# Patient Record
Sex: FEMALE | Race: WHITE | Employment: OTHER | URBAN - METROPOLITAN AREA
[De-identification: names, ages, dates, MRNs, and addresses within clinical notes are randomized per-mention and may not be internally consistent; named-entity substitution may affect disease eponyms.]

---

## 2017-08-27 ENCOUNTER — APPOINTMENT (OUTPATIENT)
Dept: GENERAL RADIOLOGY | Age: 77
End: 2017-08-27
Attending: STUDENT IN AN ORGANIZED HEALTH CARE EDUCATION/TRAINING PROGRAM
Payer: MEDICARE

## 2017-08-27 ENCOUNTER — APPOINTMENT (OUTPATIENT)
Dept: CT IMAGING | Age: 77
End: 2017-08-27
Attending: STUDENT IN AN ORGANIZED HEALTH CARE EDUCATION/TRAINING PROGRAM
Payer: MEDICARE

## 2017-08-27 ENCOUNTER — HOSPITAL ENCOUNTER (EMERGENCY)
Age: 77
Discharge: HOME | End: 2017-08-27
Attending: STUDENT IN AN ORGANIZED HEALTH CARE EDUCATION/TRAINING PROGRAM
Payer: MEDICARE

## 2017-08-27 VITALS
OXYGEN SATURATION: 99 % | HEIGHT: 65 IN | HEART RATE: 70 BPM | TEMPERATURE: 98.6 F | SYSTOLIC BLOOD PRESSURE: 142 MMHG | BODY MASS INDEX: 29.99 KG/M2 | RESPIRATION RATE: 16 BRPM | DIASTOLIC BLOOD PRESSURE: 75 MMHG | WEIGHT: 180 LBS

## 2017-08-27 DIAGNOSIS — M25.561 ACUTE PAIN OF RIGHT KNEE: Primary | ICD-10-CM

## 2017-08-27 PROCEDURE — 96372 THER/PROPH/DIAG INJ SC/IM: CPT

## 2017-08-27 PROCEDURE — 73502 X-RAY EXAM HIP UNI 2-3 VIEWS: CPT

## 2017-08-27 PROCEDURE — G8979 MOBILITY GOAL STATUS: HCPCS

## 2017-08-27 PROCEDURE — 73562 X-RAY EXAM OF KNEE 3: CPT

## 2017-08-27 PROCEDURE — 97116 GAIT TRAINING THERAPY: CPT

## 2017-08-27 PROCEDURE — 73700 CT LOWER EXTREMITY W/O DYE: CPT

## 2017-08-27 PROCEDURE — 97161 PT EVAL LOW COMPLEX 20 MIN: CPT

## 2017-08-27 PROCEDURE — G8980 MOBILITY D/C STATUS: HCPCS

## 2017-08-27 PROCEDURE — G8978 MOBILITY CURRENT STATUS: HCPCS

## 2017-08-27 PROCEDURE — 72192 CT PELVIS W/O DYE: CPT

## 2017-08-27 PROCEDURE — 99284 EMERGENCY DEPT VISIT MOD MDM: CPT

## 2017-08-27 PROCEDURE — 74011250636 HC RX REV CODE- 250/636: Performed by: STUDENT IN AN ORGANIZED HEALTH CARE EDUCATION/TRAINING PROGRAM

## 2017-08-27 RX ORDER — KETOROLAC TROMETHAMINE 30 MG/ML
15 INJECTION, SOLUTION INTRAMUSCULAR; INTRAVENOUS
Status: COMPLETED | OUTPATIENT
Start: 2017-08-27 | End: 2017-08-27

## 2017-08-27 RX ORDER — KETOROLAC TROMETHAMINE 10 MG/1
10 TABLET, FILM COATED ORAL
Qty: 12 TAB | Refills: 0 | Status: SHIPPED | OUTPATIENT
Start: 2017-08-27 | End: 2017-08-30

## 2017-08-27 RX ADMIN — KETOROLAC TROMETHAMINE 15 MG: 30 INJECTION, SOLUTION INTRAMUSCULAR at 09:10

## 2017-08-27 NOTE — PROGRESS NOTES
physical Therapy EVALUATION/DISCHARGE  Patient: Mohit Georges (89 y.o. female)  Date: 8/27/2017  Primary Diagnosis: There are no admission diagnoses documented for this encounter. Precautions:   Fall  ASSESSMENT :  Based on the objective data described below, the patient presents with pain and impaired balance limiting patient's safe functional mobility s/p fall at home. Patient completely independent prior, able to ambulate 100 ft using RW with SBA this date. Patient instructed in safe use of RW and pain management strategies for home. Patient cleared for discharge home using RW; RN notified. Patient has family that can assist at discharge. Skilled physical therapy is not indicated at this time. PLAN :  Discharge Recommendations: None  Further Equipment Recommendations for Discharge: none, has RW and rollator at home     SUBJECTIVE:   Patient stated I'm going to follow up with Dr. Cee Rascon because this knee has been bothering me.     OBJECTIVE DATA SUMMARY:   HISTORY:    Past Medical History:   Diagnosis Date    Arthritis     Gastrointestinal disorder     heartburn    Hypertension     PATIENT DENIES    Ill-defined condition     HAS LEFT TORN ROTATOR CUFF NOT REPAIRED    Psychiatric disorder     DEPRESSION    Respiratory abnormalities     Unspecified adverse effect of anesthesia     NUMEROUS TMJ SURGERIES CAUSING DIFFICULT INTUBATION SOMETIMES     Past Surgical History:   Procedure Laterality Date    ABDOMEN SURGERY PROC UNLISTED      tubal ligation    HX HEENT  1983-1988 X4    TMJ SURGERY X4    HX KNEE ARTHROSCOPY Right 2002    HX MOHS PROCEDURES Right 2007    HX TUBAL LIGATION  1974     Prior Level of Function/Home Situation: independent, lives alone  Personal factors and/or comorbidities impacting plan of care:     Home Situation  Home Environment: Private residence  # Steps to Enter: 0  Wheelchair Ramp: Yes  One/Two Story Residence: One story  Living Alone: Yes  Support Systems: Family member(s)  Patient Expects to be Discharged to[de-identified] Private residence  Current DME Used/Available at Home: Grab bars, Walker, rollator, Walker, rolling    EXAMINATION/PRESENTATION/DECISION MAKING:   Critical Behavior:  Neurologic State: Alert  Orientation Level: Oriented X4  Cognition: Appropriate decision making, Appropriate for age attention/concentration, Appropriate safety awareness  Safety/Judgement: Awareness of environment, Insight into deficits  Hearing: Auditory  Auditory Impairment: None  Skin:  Intact to exposed areas  Edema: none noted  Range Of Motion:  AROM: Within functional limits           PROM: Within functional limits           Strength:    Strength: Within functional limits                    Tone & Sensation:   Tone: Normal              Sensation: Intact               Coordination:  Coordination: Within functional limits  Vision:      Functional Mobility:  Bed Mobility:     Supine to Sit: Independent        Transfers:  Sit to Stand: Modified independent  Stand to Sit: Modified independent                       Balance:   Sitting: Intact  Standing: Intact; With support  Ambulation/Gait Training:  Distance (ft): 100 Feet (ft)  Assistive Device: Gait belt;Walker, rolling  Ambulation - Level of Assistance: Stand-by asssistance     Gait Description (WDL): Exceptions to WDL  Gait Abnormalities: Antalgic  Right Side Weight Bearing: As tolerated     Base of Support: Widened  Stance: Right decreased  Speed/Fariba: Shuffled; Slow                  Functional Measure:  Barthel Index:    Bathin  Bladder: 10  Bowels: 10  Groomin  Dressing: 10  Feeding: 10  Mobility: 10  Stairs: 5  Toilet Use: 10  Transfer (Bed to Chair and Back): 15  Total: 90       Barthel and G-code impairment scale:  Percentage of impairment CH  0% CI  1-19% CJ  20-39% CK  40-59% CL  60-79% CM  80-99% CN  100%   Barthel Score 0-100 100 99-80 79-60 59-40 20-39 1-19   0   Barthel Score 0-20 20 17-19 13-16 9-12 5-8 1-4 0      The Barthel ADL Index: Guidelines  1. The index should be used as a record of what a patient does, not as a record of what a patient could do. 2. The main aim is to establish degree of independence from any help, physical or verbal, however minor and for whatever reason. 3. The need for supervision renders the patient not independent. 4. A patient's performance should be established using the best available evidence. Asking the patient, friends/relatives and nurses are the usual sources, but direct observation and common sense are also important. However direct testing is not needed. 5. Usually the patient's performance over the preceding 24-48 hours is important, but occasionally longer periods will be relevant. 6. Middle categories imply that the patient supplies over 50 per cent of the effort. 7. Use of aids to be independent is allowed. Qi Nielsen., Barthel, D.W. (6647). Functional evaluation: the Barthel Index. [address] [facility] (14)2. Joan June candi HAYDEN Rogers, Vanessa Noguera., Fatmata Bingham., [place], [address] [place] [address] Ave (1999). Measuring the change indisability after inpatient rehabilitation; comparison of the responsiveness of the Barthel Index and Functional [place] Measure. Journal of Neurology, Neurosurgery, and Psychiatry, 66(4), 564-906. Christina Patel, N.J.A, HEATH Luna, & Mechelle Miner MYINA. (2004.) Assessment of post-stroke quality of life in cost-effectiveness studies: The usefulness of the Barthel Index and the EuroQoL-5D. Quality of Life Research, 13, 562-53       G codes: In compliance with CMSs Claims Based Outcome Reporting, the following G-code set was chosen for this patient based on their primary functional limitation being treated: The outcome measure chosen to determine the severity of the functional limitation was the Barthel Index with a score of 90/100 which was correlated with the impairment scale.     ? Mobility - Walking and Moving Around:     - CURRENT STATUS: CI - 1%-19% impaired, limited or restricted    - GOAL STATUS: CI - 1%-19% impaired, limited or restricted    - D/C STATUS:  CI - 1%-19% impaired, limited or restricted           Pain:Pain Scale 1: Numeric (0 - 10)  Pain Intensity 1: 5  Pain Location 1: Hip  Activity Tolerance:   VSS  Please refer to the flowsheet for vital signs taken during this treatment. After treatment:   [x]   Patient left in no apparent distress sitting up in chair  []   Patient left in no apparent distress in bed  [x]   Call bell left within reach  [x]   Nursing notified  [x]   Caregiver present  []   Bed alarm activated    COMMUNICATION/EDUCATION:   Communication/Collaboration:  [x]   Fall prevention education was provided and the patient/caregiver indicated understanding. [x]   Patient/family have participated as able and agree with findings and recommendations. []   Patient is unable to participate in plan of care at this time.   Findings and recommendations were discussed with: Registered Nurse    Thank you for this referral.  Zackery Hill, PT   Time Calculation: 18 mins

## 2017-08-27 NOTE — ED NOTES
Attempted to ambulate pt with crutches. Pt unable to walk with crutches due to increase pain to right knee. Pt states sh is unable to apply any pressure. Dr Ariane Holland made aware. Order placed for PT to see pt.

## 2017-08-27 NOTE — ED PROVIDER NOTES
HPI Comments: 69 y.o. female with past medical history significant for Hypertension, Depression, and Arthritis who presents from Home with chief complaint of Right Hip Pain. Patient states earlier this morning she was walking with her cane when \"she tripped over a hose\" falling onto her right leg. Pt states immediate onset of right leg pain. Pt states constant moderate right hip pain with radiation down her right leg. Pt states aggravation with applied pressure and with ROM of her right hip. Pt denies hitting her head or LOC. Pt denies fever, chills, cough, congestion, SOB, chest pain, abdominal pain, nausea, vomiting, diarrhea, difficulty urinating, or dysuria. Pt denies any other acute medical complaints. There are no other acute medical concerns at this time. PCP: Michael Goel MD    Note written by Rachael Holguin, as dictated by Jose Kenny MD 8:22 AM    The history is provided by the patient.         Past Medical History:   Diagnosis Date    Arthritis     Gastrointestinal disorder     heartburn    Hypertension     PATIENT DENIES    Ill-defined condition     HAS LEFT TORN ROTATOR CUFF NOT REPAIRED    Psychiatric disorder     DEPRESSION    Respiratory abnormalities     Unspecified adverse effect of anesthesia     NUMEROUS TMJ SURGERIES CAUSING DIFFICULT INTUBATION SOMETIMES       Past Surgical History:   Procedure Laterality Date    ABDOMEN SURGERY PROC UNLISTED      tubal ligation    HX HEENT  [id number] X4    TMJ SURGERY X4    HX KNEE ARTHROSCOPY Right 2002    HX MOHS PROCEDURES Right 2007    HX TUBAL LIGATION  1974         Family History:   Problem Relation Age of Onset    Stroke Mother     Diabetes Father     Heart Attack Sister     Diabetes Sister     Cancer Sister      URETHRA    Asthma Brother     Other Brother      SUICIDE    Heart Attack Brother     Heart Disease Sister     Heart Surgery Sister     Cancer Brother      PROSTATE    Diabetes Brother Social History     Social History    Marital status:      Spouse name: N/A    Number of children: N/A    Years of education: N/A     Occupational History    Not on file. Social History Main Topics    Smoking status: Never Smoker    Smokeless tobacco: Never Used    Alcohol use No    Drug use: No    Sexual activity: Not on file     Other Topics Concern    Not on file     Social History Narrative         ALLERGIES: Codeine    Review of Systems   Constitutional: Negative for chills and fever. HENT: Negative for congestion. Respiratory: Negative for cough and shortness of breath. Cardiovascular: Negative for chest pain. Gastrointestinal: Negative for abdominal pain, diarrhea, nausea and vomiting. Genitourinary: Negative for difficulty urinating and dysuria. Musculoskeletal: Positive for arthralgias. All other systems reviewed and are negative. Vitals:    08/27/17 0930 08/27/17 0945 08/27/17 1100 08/27/17 1145   BP: 142/85 129/57 146/72 149/77   Pulse:       Resp:       Temp:       SpO2: 99% 98% 98% 99%   Weight:       Height:                Physical Exam   Constitutional: She is oriented to person, place, and time. She appears well-developed and well-nourished. No distress. HENT:   Head: Normocephalic and atraumatic. Nose: Nose normal.   Mouth/Throat: Oropharynx is clear and moist. No oropharyngeal exudate. Eyes: Conjunctivae and EOM are normal. Right eye exhibits no discharge. Left eye exhibits no discharge. No scleral icterus. Neck: Normal range of motion. Neck supple. No JVD present. No tracheal deviation present. No thyromegaly present. Cardiovascular: Normal rate, regular rhythm, normal heart sounds and intact distal pulses. Exam reveals no gallop and no friction rub. No murmur heard. Pulmonary/Chest: Effort normal and breath sounds normal. No stridor. No respiratory distress. She has no wheezes. She has no rales. She exhibits no tenderness.    Abdominal: Bowel sounds are normal. She exhibits no distension and no mass. There is no tenderness. There is no rebound. Musculoskeletal: She exhibits tenderness. She exhibits no deformity. Tenderness with ROM of right hip   Lymphadenopathy:     She has no cervical adenopathy. Neurological: She is alert and oriented to person, place, and time. No cranial nerve deficit. Coordination normal.   Skin: Skin is warm and dry. No rash noted. She is not diaphoretic. No erythema. No pallor. Psychiatric: She has a normal mood and affect. Her behavior is normal. Judgment and thought content normal.    Note written by Rachael Payan, as dictated by Ruben Chavira MD 9:18 AM    MDM  Number of Diagnoses or Management Options  Acute pain of right knee:      Amount and/or Complexity of Data Reviewed  Tests in the radiology section of CPT®: ordered and reviewed  Review and summarize past medical records: yes    Risk of Complications, Morbidity, and/or Mortality  Presenting problems: moderate  Diagnostic procedures: moderate  Management options: moderate    Patient Progress  Patient progress: stable    ED Course       Procedures      PROGRESS NOTE:9:18 AM  Right knee xray is negative      PROGRESS NOTE:10:27 AM  Hip/Pelvic Xray is negative    Provider has discussed results and plan of treatment with patient and patient's family. Patient expressed understanding and agreement of treatment plan. Patient continues to report pain. Will CT pelvis    PROGRESS NOTE:12:12 PM  CT is negative    3:57 PM  The patient has been reevaluated. The patient is ready for discharge. The patient's signs, symptoms, diagnosis, and discharge instructions have been discussed and the patient/ family has conveyed their understanding. The patient is to follow up as recommended or return to the ED should their symptoms worsen. Plan has been discussed and the patient is in agreement.     LABORATORY TESTS:  No results found for this or any previous visit (from the past 12 hour(s)). IMAGING RESULTS:  CT LOW EXT RT WO CONT   Final Result      CT PELV WO CONT   Final Result      XR HIP RT W OR WO PELV 2-3 VWS   Final Result      XR KNEE RT 3 V   Final Result        No results found. MEDICATIONS GIVEN:  Medications   ketorolac (TORADOL) injection 15 mg (15 mg IntraMUSCular Given 8/27/17 0910)       IMPRESSION:  1. Acute pain of right knee        PLAN:  1. Discharge Medication List as of 8/27/2017  1:48 PM      START taking these medications    Details   ketorolac (TORADOL) 10 mg tablet Take 1 Tab by mouth every six (6) hours as needed for Pain for up to 3 days. , Print, Disp-12 Tab, R-0         CONTINUE these medications which have NOT CHANGED    Details   oxyCODONE IR (ROXICODONE) 5 mg immediate release tablet Take 1 tablet by mouth every three (3) hours as needed. , Print, Disp-80 tablet, R-0      ascorbic acid (VITAMIN C) 500 mg tablet Take  by mouth every Monday, Wednesday, Friday., Historical Med      Calcium-Cholecalciferol, D3, (CALCIUM 600 WITH VITAMIN D3) 600 mg(1,500mg) -400 unit cap Take  by mouth every Monday, Wednesday, Friday., Historical Med      VIT B COMPLEX 100 CMB #2/HERBS (VITAMIN B COMPLEX 100 #2-HERBS PO) Take  by mouth every Monday, Wednesday, Friday., Historical Med           2.    Follow-up Information     Follow up With Details Comments Contact Info    Marita Rider MD  If symptoms worsen [address] [facility] EMERGENCY DEP  If symptoms worsen [address] [facility]  [phone]            Return to ED for new or worsening symptoms       Jluis Mcnally MD

## 2017-08-27 NOTE — ED NOTES
Attempted to ambulate pt. Pt unable to ambulate and apply pressure to right knee. Dr Leila Harris made aware. V/O to ambulate pt on crutches.

## 2017-08-27 NOTE — DISCHARGE INSTRUCTIONS
We hope that we have addressed all of your medical concerns. The examination and treatment you received in the Emergency Department were for an emergent problem and were not intended as complete care. It is important that you follow up with your healthcare provider(s) for ongoing care. If your symptoms worsen or do not improve as expected, and you are unable to reach your usual health care provider(s), you should return to the Emergency Department. Today's healthcare is undergoing tremendous change, and patient satisfaction surveys are one of the many tools to assess the quality of medical care. You may receive a survey from the [employer] regarding your experience in the Emergency Department. I hope that your experience has been completely positive, particularly the medical care that I provided. As such, please participate in the survey; anything less than excellent does not meet my expectations or intentions. [facility] and [facility] participate in nationally recognized quality of care measures. If your blood pressure is greater than 120/80, as reported below, we urge that you seek medical care to address the potential of high blood pressure, commonly known as hypertension. Hypertension can be hereditary or can be caused by certain medical conditions, pain, stress, or \"white coat syndrome. \"       Please make an appointment with your health care provider(s) for follow up of your Emergency Department visit.        VITALS:   Patient Vitals for the past 8 hrs:   Temp Pulse Resp BP SpO2   08/27/17 1145 - - - 149/77 99 %   08/27/17 1100 - - - 146/72 98 %   08/27/17 0945 - - - 129/57 98 %   08/27/17 0930 - - - 142/85 99 %   08/27/17 0915 - - - 161/76 100 %   08/27/17 0900 - - - 146/64 100 %   08/27/17 0845 - - - 148/72 98 %   08/27/17 0830 - - - 144/71 98 %   08/27/17 0815 - - - (!) 138/124 96 %   08/27/17 0800 - - - 153/74 100 %   08/27/17 0753 98.7 °F (37.1 °C) 80 18 174/74 98 %          Thank you for allowing us to provide you with medical care today. We realize that you have many choices for your emergency care needs. Please choose us in the future for any continued health care needs. Burleigh Eisenmenger Eunice Bower, [address] [facility].   Office: [phone]            No results found for this or any previous visit (from the past 24 hour(s)). Xr Hip Rt W Or Wo Pelv 2-3 Vws    Result Date: 8/27/2017  INDICATION:  eval for hip fx. Right hip pain, status post ground-level fall this morning. AP view of the pelvis and lateral view of the right hip. No acute fracture or dislocation is visualized. There is moderate narrowing of the hip joint spaces bilaterally. Bones are well-mineralized. Soft tissues are normal. No lytic or sclerotic osseous lesion is visualized. IMPRESSION: No evidence of acute fracture or dislocation. Ct Pelv Wo Cont    Result Date: 8/27/2017  INDICATION: Right hip pain, status post ground-level fall this morning. Noncontrast CT of the pelvis is performed with 0.65 mm collimation. Sagittal and coronal reformatted images were also obtained. CT dose reduction was achieved with the use of the standardized protocol tailored for this examination and automatic exposure control for dose modulation. Direct comparison is made to prior films dated August 27, 2017. The osseous structures are diffusely demineralized. No acute fracture is visualized. No lytic or sclerotic osseous lesion is seen. There is no diastases of the SI joints or pubic symphysis. IMPRESSION: No evidence of acute fracture or dislocation. If clinical suspicion for occult fracture persists, MR is recommended. Ct Low Ext Rt Wo Cont    Result Date: 8/27/2017  EXAM:  CT LOW EXT RT WO CONT INDICATION:  Right knee pain since fall this morning.  COMPARISON: Right knee views earlier today at 8:46 AM. TECHNIQUE: Helical CT of the right knee with coronal and sagittal reformats. Images reviewed in soft tissue and bone windows. CT dose reduction was achieved through the use of a standardized protocol tailored for this examination and automatic exposure control for dose modulation. CONTRAST: None. FINDINGS: Bones: Bones are osteopenic. No acute fracture or dislocation. No lytic or blastic lesion. Joint fluid:  Trace joint fluid. No radiodense foreign body. Articulations: Mild patellofemoral and lateral compartment osteoarthritis. Moderate medial compartment osteoarthritis. No erosion or chondrocalcinosis. Tendons: Extensor mechanism is intact. No evidence of full-thickness tear. Muscles: Mild atrophy. Soft tissue mass: None. IMPRESSION: 1. No fracture. 2. 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 Xr Knee Rt 3 V    Result Date: 8/27/2017  INDICATION:  eval for knee fx. Ground-level fall this morning. Exam: AP, lateral, oblique views of the right knee. FINDINGS: There is no acute fracture-dislocation. There is moderate narrowing of the medial tibiofemoral joint space. There are small tricompartmental osteophytes. Quadriceps tendon enthesophyte is noted. No suprapatellar joint fluid is seen. IMPRESSION: No acute fracture or dislocation. Knee Pain or Injury: Care Instructions  Your Care Instructions    Injuries are a common cause of knee problems. Sudden (acute) injuries may be caused by a direct blow to the knee. They can also be caused by abnormal twisting, bending, or falling on the knee. Pain, bruising, or swelling may be severe, and may start within minutes of the injury. Overuse is another cause of knee pain. Other causes are climbing stairs, kneeling, and other activities that use the knee. Everyday wear and tear, especially as you get older, also can cause knee pain.   Rest, along with home treatment, often relieves pain and allows your knee to heal. If you have a serious knee injury, you may need tests and treatment. Follow-up care is a key part of your treatment and safety. Be sure to make and go to all appointments, and call your doctor if you are having problems. It's also a good idea to know your test results and keep a list of the medicines you take. How can you care for yourself at home? · Be safe with medicines. Read and follow all instructions on the label. ¨ If the doctor gave you a prescription medicine for pain, take it as prescribed. ¨ If you are not taking a prescription pain medicine, ask your doctor if you can take an over-the-counter medicine. · Rest and protect your knee. Take a break from any activity that may cause pain. · Put ice or a cold pack on your knee for 10 to 20 minutes at a time. Put a thin cloth between the ice and your skin. · Prop up a sore knee on a pillow when you ice it or anytime you sit or lie down for the next 3 days. Try to keep it above the level of your heart. This will help reduce swelling. · If your knee is not swollen, you can put moist heat, a heating pad, or a warm cloth on your knee. · If your doctor recommends an elastic bandage, sleeve, or other type of support for your knee, wear it as directed. · Follow your doctor's instructions about how much weight you can put on your leg. Use a cane, crutches, or a walker as instructed. · Follow your doctor's instructions about activity during your healing process. If you can do mild exercise, slowly increase your activity. · Reach and stay at a healthy weight. Extra weight can strain the joints, especially the knees and hips, and make the pain worse. Losing even a few pounds may help. When should you call for help? Call 911 anytime you think you may need emergency care. For example, call if:  · You have symptoms of a blood clot in your lung (called a pulmonary embolism). These may include:  ¨ Sudden chest pain. ¨ Trouble breathing. ¨ Coughing up blood.   Call your doctor now or seek immediate medical care if:  · You have severe or increasing pain. · Your leg or foot turns cold or changes color. · You cannot stand or put weight on your knee. · Your knee looks twisted or bent out of shape. · You cannot move your knee. · You have signs of infection, such as:  ¨ Increased pain, swelling, warmth, or redness. ¨ Red streaks leading from the knee. ¨ Pus draining from a place on your knee. ¨ A fever. · You have signs of a blood clot in your leg (called a deep vein thrombosis), such as:  ¨ Pain in your calf, back of the knee, thigh, or groin. ¨ Redness and swelling in your leg or groin. Watch closely for changes in your health, and be sure to contact your doctor if:  · You have tingling, weakness, or numbness in your knee. · You have any new symptoms, such as swelling. · You have bruises from a knee injury that last longer than 2 weeks. · You do not get better as expected. Where can you learn more? Go to http://lico-jake.info/. Enter K195 in the search box to learn more about \"Knee Pain or Injury: Care Instructions. \"  Current as of: March 20, 2017  Content Version: 11.3  © 7164-9260 [employer]. Care instructions adapted under license by [employer] (which disclaims liability or warranty for this information). If you have questions about a medical condition or this instruction, always ask your healthcare professional. [place] [address] any warranty or liability for your use of this information.

## 2017-10-30 ENCOUNTER — HOSPITAL ENCOUNTER (OUTPATIENT)
Dept: PREADMISSION TESTING | Age: 77
Discharge: HOME | End: 2017-10-30
Payer: MEDICARE

## 2017-10-30 ENCOUNTER — APPOINTMENT (OUTPATIENT)
Dept: PREADMISSION TESTING | Age: 77
End: 2017-10-30
Payer: MEDICARE

## 2017-10-30 VITALS
WEIGHT: 180.38 LBS | HEART RATE: 75 BPM | HEIGHT: 66 IN | SYSTOLIC BLOOD PRESSURE: 148 MMHG | BODY MASS INDEX: 28.99 KG/M2 | DIASTOLIC BLOOD PRESSURE: 82 MMHG | TEMPERATURE: 98 F

## 2017-10-30 LAB
ABO + RH BLD: NORMAL
ANION GAP SERPL CALC-SCNC: 6 MMOL/L (ref 5–15)
APPEARANCE UR: ABNORMAL
ATRIAL RATE: 71 BPM
BACTERIA URNS QL MICRO: ABNORMAL /HPF
BILIRUB UR QL: NEGATIVE
BLOOD GROUP ANTIBODIES SERPL: NORMAL
BUN SERPL-MCNC: 12 MG/DL (ref 6–20)
BUN/CREAT SERPL: 18 (ref 12–20)
CALCIUM SERPL-MCNC: 8.5 MG/DL (ref 8.5–10.1)
CALCULATED P AXIS, ECG09: 51 DEGREES
CALCULATED R AXIS, ECG10: 4 DEGREES
CALCULATED T AXIS, ECG11: 18 DEGREES
CAOX CRY URNS QL MICRO: ABNORMAL
CHLORIDE SERPL-SCNC: 108 MMOL/L (ref 97–108)
CO2 SERPL-SCNC: 27 MMOL/L (ref 21–32)
COLOR UR: ABNORMAL
CREAT SERPL-MCNC: 0.68 MG/DL (ref 0.55–1.02)
DIAGNOSIS, 93000: NORMAL
EPITH CASTS URNS QL MICRO: ABNORMAL /LPF
ERYTHROCYTE [DISTWIDTH] IN BLOOD BY AUTOMATED COUNT: 13.4 % (ref 11.5–14.5)
EST. AVERAGE GLUCOSE BLD GHB EST-MCNC: 114 MG/DL
GLUCOSE SERPL-MCNC: 91 MG/DL (ref 65–100)
GLUCOSE UR STRIP.AUTO-MCNC: NEGATIVE MG/DL
HBA1C MFR BLD: 5.6 % (ref 4.2–6.3)
HCT VFR BLD AUTO: 41.6 % (ref 35–47)
HGB BLD-MCNC: 13.4 G/DL (ref 11.5–16)
HGB UR QL STRIP: NEGATIVE
INR PPP: 1 (ref 0.9–1.1)
KETONES UR QL STRIP.AUTO: NEGATIVE MG/DL
LEUKOCYTE ESTERASE UR QL STRIP.AUTO: NEGATIVE
MCH RBC QN AUTO: 29.4 PG (ref 26–34)
MCHC RBC AUTO-ENTMCNC: 32.2 G/DL (ref 30–36.5)
MCV RBC AUTO: 91.2 FL (ref 80–99)
MUCOUS THREADS URNS QL MICRO: ABNORMAL /LPF
NITRITE UR QL STRIP.AUTO: NEGATIVE
P-R INTERVAL, ECG05: 188 MS
PH UR STRIP: 5.5 [PH] (ref 5–8)
PLATELET # BLD AUTO: 249 K/UL (ref 150–400)
POTASSIUM SERPL-SCNC: 4.3 MMOL/L (ref 3.5–5.1)
PROT UR STRIP-MCNC: NEGATIVE MG/DL
PROTHROMBIN TIME: 9.9 SEC (ref 9–11.1)
Q-T INTERVAL, ECG07: 392 MS
QRS DURATION, ECG06: 80 MS
QTC CALCULATION (BEZET), ECG08: 425 MS
RBC # BLD AUTO: 4.56 M/UL (ref 3.8–5.2)
RBC #/AREA URNS HPF: ABNORMAL /HPF (ref 0–5)
SODIUM SERPL-SCNC: 141 MMOL/L (ref 136–145)
SP GR UR REFRACTOMETRY: 1.02 (ref 1–1.03)
SPECIMEN EXP DATE BLD: NORMAL
UA: UC IF INDICATED,UAUC: ABNORMAL
UROBILINOGEN UR QL STRIP.AUTO: 0.2 EU/DL (ref 0.2–1)
VENTRICULAR RATE, ECG03: 71 BPM
WBC # BLD AUTO: 5.4 K/UL (ref 3.6–11)
WBC URNS QL MICRO: ABNORMAL /HPF (ref 0–4)
YEAST URNS QL MICRO: PRESENT

## 2017-10-30 PROCEDURE — 80048 BASIC METABOLIC PNL TOTAL CA: CPT | Performed by: ORTHOPAEDIC SURGERY

## 2017-10-30 PROCEDURE — 85610 PROTHROMBIN TIME: CPT | Performed by: ORTHOPAEDIC SURGERY

## 2017-10-30 PROCEDURE — 83036 HEMOGLOBIN GLYCOSYLATED A1C: CPT | Performed by: ORTHOPAEDIC SURGERY

## 2017-10-30 PROCEDURE — 81001 URINALYSIS AUTO W/SCOPE: CPT | Performed by: ORTHOPAEDIC SURGERY

## 2017-10-30 PROCEDURE — 36415 COLL VENOUS BLD VENIPUNCTURE: CPT | Performed by: ORTHOPAEDIC SURGERY

## 2017-10-30 PROCEDURE — 85027 COMPLETE CBC AUTOMATED: CPT | Performed by: ORTHOPAEDIC SURGERY

## 2017-10-30 PROCEDURE — 87086 URINE CULTURE/COLONY COUNT: CPT | Performed by: ORTHOPAEDIC SURGERY

## 2017-10-30 PROCEDURE — 93005 ELECTROCARDIOGRAM TRACING: CPT

## 2017-10-30 PROCEDURE — 86900 BLOOD TYPING SEROLOGIC ABO: CPT | Performed by: ORTHOPAEDIC SURGERY

## 2017-10-30 RX ORDER — CALCIUM CARBONATE 200(500)MG
1 TABLET,CHEWABLE ORAL AS NEEDED
COMMUNITY

## 2017-10-31 LAB
BACTERIA SPEC CULT: NORMAL
BACTERIA SPEC CULT: NORMAL
SERVICE CMNT-IMP: NORMAL

## 2017-11-01 LAB
BACTERIA SPEC CULT: NORMAL
CC UR VC: NORMAL
SERVICE CMNT-IMP: NORMAL

## 2017-11-20 ENCOUNTER — ANESTHESIA (OUTPATIENT)
Dept: SURGERY | Age: 77
DRG: 470 | End: 2017-11-20
Payer: MEDICARE

## 2017-11-20 ENCOUNTER — ANESTHESIA EVENT (OUTPATIENT)
Dept: SURGERY | Age: 77
DRG: 470 | End: 2017-11-20
Payer: MEDICARE

## 2017-11-20 ENCOUNTER — HOSPITAL ENCOUNTER (INPATIENT)
Age: 77
LOS: 2 days | Discharge: HOME HEALTH | DRG: 470 | End: 2017-11-22
Attending: ORTHOPAEDIC SURGERY | Admitting: ORTHOPAEDIC SURGERY
Payer: MEDICARE

## 2017-11-20 PROBLEM — M17.11 PRIMARY OSTEOARTHRITIS OF RIGHT KNEE: Status: ACTIVE | Noted: 2017-11-20

## 2017-11-20 LAB
ABO + RH BLD: NORMAL
BLOOD GROUP ANTIBODIES SERPL: NORMAL
GLUCOSE BLD STRIP.AUTO-MCNC: 84 MG/DL (ref 65–100)
SERVICE CMNT-IMP: NORMAL
SPECIMEN EXP DATE BLD: NORMAL

## 2017-11-20 PROCEDURE — 77030008467 HC STPLR SKN COVD -B: Performed by: ORTHOPAEDIC SURGERY

## 2017-11-20 PROCEDURE — 76210000006 HC OR PH I REC 0.5 TO 1 HR: Performed by: ORTHOPAEDIC SURGERY

## 2017-11-20 PROCEDURE — 74011250636 HC RX REV CODE- 250/636

## 2017-11-20 PROCEDURE — 86900 BLOOD TYPING SEROLOGIC ABO: CPT | Performed by: ORTHOPAEDIC SURGERY

## 2017-11-20 PROCEDURE — 74011000250 HC RX REV CODE- 250

## 2017-11-20 PROCEDURE — 77030012935 HC DRSG AQUACEL BMS -B: Performed by: ORTHOPAEDIC SURGERY

## 2017-11-20 PROCEDURE — 65270000029 HC RM PRIVATE

## 2017-11-20 PROCEDURE — 74011000258 HC RX REV CODE- 258: Performed by: ORTHOPAEDIC SURGERY

## 2017-11-20 PROCEDURE — 77030018836 HC SOL IRR NACL ICUM -A: Performed by: ORTHOPAEDIC SURGERY

## 2017-11-20 PROCEDURE — 97161 PT EVAL LOW COMPLEX 20 MIN: CPT

## 2017-11-20 PROCEDURE — 77030010783 HC BOWL MX BN CEM J&J -B: Performed by: ORTHOPAEDIC SURGERY

## 2017-11-20 PROCEDURE — 77030034850: Performed by: ORTHOPAEDIC SURGERY

## 2017-11-20 PROCEDURE — 77030018846 HC SOL IRR STRL H20 ICUM -A: Performed by: ORTHOPAEDIC SURGERY

## 2017-11-20 PROCEDURE — 76010000171 HC OR TIME 2 TO 2.5 HR INTENSV-TIER 1: Performed by: ORTHOPAEDIC SURGERY

## 2017-11-20 PROCEDURE — C1713 ANCHOR/SCREW BN/BN,TIS/BN: HCPCS | Performed by: ORTHOPAEDIC SURGERY

## 2017-11-20 PROCEDURE — 74011000250 HC RX REV CODE- 250: Performed by: ORTHOPAEDIC SURGERY

## 2017-11-20 PROCEDURE — 77030020788: Performed by: ORTHOPAEDIC SURGERY

## 2017-11-20 PROCEDURE — 77030011640 HC PAD GRND REM COVD -A: Performed by: ORTHOPAEDIC SURGERY

## 2017-11-20 PROCEDURE — 36415 COLL VENOUS BLD VENIPUNCTURE: CPT | Performed by: ORTHOPAEDIC SURGERY

## 2017-11-20 PROCEDURE — G8978 MOBILITY CURRENT STATUS: HCPCS

## 2017-11-20 PROCEDURE — 77030028224 HC PDNG CST BSNM -A: Performed by: ORTHOPAEDIC SURGERY

## 2017-11-20 PROCEDURE — G8979 MOBILITY GOAL STATUS: HCPCS

## 2017-11-20 PROCEDURE — 77030000032 HC CUF TRNQT ZIMM -B: Performed by: ORTHOPAEDIC SURGERY

## 2017-11-20 PROCEDURE — 77030011264 HC ELECTRD BLD EXT COVD -A: Performed by: ORTHOPAEDIC SURGERY

## 2017-11-20 PROCEDURE — 97116 GAIT TRAINING THERAPY: CPT

## 2017-11-20 PROCEDURE — 77030007866 HC KT SPN ANES BBMI -B: Performed by: ANESTHESIOLOGY

## 2017-11-20 PROCEDURE — 76060000035 HC ANESTHESIA 2 TO 2.5 HR: Performed by: ORTHOPAEDIC SURGERY

## 2017-11-20 PROCEDURE — 77030031139 HC SUT VCRL2 J&J -A: Performed by: ORTHOPAEDIC SURGERY

## 2017-11-20 PROCEDURE — 74011250636 HC RX REV CODE- 250/636: Performed by: ORTHOPAEDIC SURGERY

## 2017-11-20 PROCEDURE — 64447 NJX AA&/STRD FEMORAL NRV IMG: CPT

## 2017-11-20 PROCEDURE — 74011250636 HC RX REV CODE- 250/636: Performed by: ANESTHESIOLOGY

## 2017-11-20 PROCEDURE — 77030020782 HC GWN BAIR PAWS FLX 3M -B

## 2017-11-20 PROCEDURE — 77030003601 HC NDL NRV BLK BBMI -A

## 2017-11-20 PROCEDURE — 3E0T3BZ INTRODUCTION OF ANESTHETIC AGENT INTO PERIPHERAL NERVES AND PLEXI, PERCUTANEOUS APPROACH: ICD-10-PCS | Performed by: NURSE ANESTHETIST, CERTIFIED REGISTERED

## 2017-11-20 PROCEDURE — 74011250637 HC RX REV CODE- 250/637: Performed by: ORTHOPAEDIC SURGERY

## 2017-11-20 PROCEDURE — 74011000258 HC RX REV CODE- 258

## 2017-11-20 PROCEDURE — 77030035236 HC SUT PDS STRATFX BARB J&J -B: Performed by: ORTHOPAEDIC SURGERY

## 2017-11-20 PROCEDURE — 77030002933 HC SUT MCRYL J&J -A: Performed by: ORTHOPAEDIC SURGERY

## 2017-11-20 PROCEDURE — C9290 INJ, BUPIVACAINE LIPOSOME: HCPCS | Performed by: ORTHOPAEDIC SURGERY

## 2017-11-20 PROCEDURE — 77030006822 HC BLD SAW SAG BRSM -B: Performed by: ORTHOPAEDIC SURGERY

## 2017-11-20 PROCEDURE — C1776 JOINT DEVICE (IMPLANTABLE): HCPCS | Performed by: ORTHOPAEDIC SURGERY

## 2017-11-20 PROCEDURE — 82962 GLUCOSE BLOOD TEST: CPT

## 2017-11-20 PROCEDURE — 0SRC0L9 REPLACEMENT OF RIGHT KNEE JOINT WITH MEDIAL UNICONDYLAR SYNTHETIC SUBSTITUTE, CEMENTED, OPEN APPROACH: ICD-10-PCS | Performed by: ORTHOPAEDIC SURGERY

## 2017-11-20 DEVICE — TIBIAL INSERT FIX S2 RM - 9MM
Type: IMPLANTABLE DEVICE | Site: KNEE | Status: FUNCTIONAL
Brand: MOTO PARTIAL KNEE SYSTEM - MEDIAL

## 2017-11-20 DEVICE — CEMENT BNE 40GM FULL DOSE PMMA W/ GENT HI VISC RADPQ LNG: Type: IMPLANTABLE DEVICE | Site: KNEE | Status: FUNCTIONAL

## 2017-11-20 DEVICE — COMPONENT TOT KNEE UPLR FEM PART: Type: IMPLANTABLE DEVICE | Status: FUNCTIONAL

## 2017-11-20 RX ORDER — OXYCODONE AND ACETAMINOPHEN 5; 325 MG/1; MG/1
1 TABLET ORAL AS NEEDED
Status: DISCONTINUED | OUTPATIENT
Start: 2017-11-20 | End: 2017-11-20 | Stop reason: HOSPADM

## 2017-11-20 RX ORDER — PROPOFOL 10 MG/ML
INJECTION, EMULSION INTRAVENOUS
Status: DISCONTINUED | OUTPATIENT
Start: 2017-11-20 | End: 2017-11-20 | Stop reason: HOSPADM

## 2017-11-20 RX ORDER — NALOXONE HYDROCHLORIDE 0.4 MG/ML
0.4 INJECTION, SOLUTION INTRAMUSCULAR; INTRAVENOUS; SUBCUTANEOUS AS NEEDED
Status: DISCONTINUED | OUTPATIENT
Start: 2017-11-20 | End: 2017-11-22 | Stop reason: HOSPADM

## 2017-11-20 RX ORDER — MORPHINE SULFATE 10 MG/ML
2 INJECTION, SOLUTION INTRAMUSCULAR; INTRAVENOUS
Status: DISCONTINUED | OUTPATIENT
Start: 2017-11-20 | End: 2017-11-20 | Stop reason: HOSPADM

## 2017-11-20 RX ORDER — HYDROMORPHONE HYDROCHLORIDE 1 MG/ML
0.5 INJECTION, SOLUTION INTRAMUSCULAR; INTRAVENOUS; SUBCUTANEOUS
Status: ACTIVE | OUTPATIENT
Start: 2017-11-20 | End: 2017-11-21

## 2017-11-20 RX ORDER — ACETAMINOPHEN 325 MG/1
650 TABLET ORAL
Status: DISCONTINUED | OUTPATIENT
Start: 2017-11-20 | End: 2017-11-21

## 2017-11-20 RX ORDER — SODIUM CHLORIDE 9 MG/ML
125 INJECTION, SOLUTION INTRAVENOUS CONTINUOUS
Status: DISPENSED | OUTPATIENT
Start: 2017-11-20 | End: 2017-11-21

## 2017-11-20 RX ORDER — SODIUM CHLORIDE 0.9 % (FLUSH) 0.9 %
5-10 SYRINGE (ML) INJECTION AS NEEDED
Status: DISCONTINUED | OUTPATIENT
Start: 2017-11-20 | End: 2017-11-22 | Stop reason: HOSPADM

## 2017-11-20 RX ORDER — SODIUM CHLORIDE 0.9 % (FLUSH) 0.9 %
5-10 SYRINGE (ML) INJECTION AS NEEDED
Status: DISCONTINUED | OUTPATIENT
Start: 2017-11-20 | End: 2017-11-20 | Stop reason: HOSPADM

## 2017-11-20 RX ORDER — POLYETHYLENE GLYCOL 3350 17 G/17G
17 POWDER, FOR SOLUTION ORAL DAILY
Status: DISCONTINUED | OUTPATIENT
Start: 2017-11-21 | End: 2017-11-22 | Stop reason: HOSPADM

## 2017-11-20 RX ORDER — ACETAMINOPHEN 500 MG
1000 TABLET ORAL ONCE
Status: COMPLETED | OUTPATIENT
Start: 2017-11-20 | End: 2017-11-20

## 2017-11-20 RX ORDER — FACIAL-BODY WIPES
10 EACH TOPICAL DAILY PRN
Status: DISCONTINUED | OUTPATIENT
Start: 2017-11-22 | End: 2017-11-22 | Stop reason: HOSPADM

## 2017-11-20 RX ORDER — LIDOCAINE HYDROCHLORIDE 10 MG/ML
0.1 INJECTION, SOLUTION EPIDURAL; INFILTRATION; INTRACAUDAL; PERINEURAL AS NEEDED
Status: DISCONTINUED | OUTPATIENT
Start: 2017-11-20 | End: 2017-11-20 | Stop reason: HOSPADM

## 2017-11-20 RX ORDER — ASPIRIN 325 MG
325 TABLET, DELAYED RELEASE (ENTERIC COATED) ORAL EVERY 12 HOURS
Status: DISCONTINUED | OUTPATIENT
Start: 2017-11-20 | End: 2017-11-22 | Stop reason: HOSPADM

## 2017-11-20 RX ORDER — MIDAZOLAM HYDROCHLORIDE 1 MG/ML
0.5 INJECTION, SOLUTION INTRAMUSCULAR; INTRAVENOUS
Status: DISCONTINUED | OUTPATIENT
Start: 2017-11-20 | End: 2017-11-20 | Stop reason: HOSPADM

## 2017-11-20 RX ORDER — SODIUM CHLORIDE 0.9 % (FLUSH) 0.9 %
5-10 SYRINGE (ML) INJECTION EVERY 8 HOURS
Status: DISCONTINUED | OUTPATIENT
Start: 2017-11-20 | End: 2017-11-20 | Stop reason: HOSPADM

## 2017-11-20 RX ORDER — DEXAMETHASONE SODIUM PHOSPHATE 100 MG/10ML
4 INJECTION INTRAMUSCULAR; INTRAVENOUS ONCE
Status: DISCONTINUED | OUTPATIENT
Start: 2017-11-20 | End: 2017-11-20 | Stop reason: HOSPADM

## 2017-11-20 RX ORDER — FENTANYL CITRATE 50 UG/ML
50 INJECTION, SOLUTION INTRAMUSCULAR; INTRAVENOUS AS NEEDED
Status: DISCONTINUED | OUTPATIENT
Start: 2017-11-20 | End: 2017-11-20 | Stop reason: HOSPADM

## 2017-11-20 RX ORDER — SODIUM CHLORIDE 0.9 % (FLUSH) 0.9 %
5-10 SYRINGE (ML) INJECTION EVERY 8 HOURS
Status: DISCONTINUED | OUTPATIENT
Start: 2017-11-21 | End: 2017-11-22 | Stop reason: HOSPADM

## 2017-11-20 RX ORDER — DIPHENHYDRAMINE HYDROCHLORIDE 50 MG/ML
12.5 INJECTION, SOLUTION INTRAMUSCULAR; INTRAVENOUS AS NEEDED
Status: DISCONTINUED | OUTPATIENT
Start: 2017-11-20 | End: 2017-11-20 | Stop reason: HOSPADM

## 2017-11-20 RX ORDER — AMOXICILLIN 250 MG
1 CAPSULE ORAL 2 TIMES DAILY
Status: DISCONTINUED | OUTPATIENT
Start: 2017-11-20 | End: 2017-11-22 | Stop reason: HOSPADM

## 2017-11-20 RX ORDER — SODIUM CHLORIDE, SODIUM LACTATE, POTASSIUM CHLORIDE, CALCIUM CHLORIDE 600; 310; 30; 20 MG/100ML; MG/100ML; MG/100ML; MG/100ML
125 INJECTION, SOLUTION INTRAVENOUS CONTINUOUS
Status: DISCONTINUED | OUTPATIENT
Start: 2017-11-20 | End: 2017-11-20 | Stop reason: HOSPADM

## 2017-11-20 RX ORDER — CEFAZOLIN SODIUM/WATER 2 G/20 ML
2 SYRINGE (ML) INTRAVENOUS ONCE
Status: COMPLETED | OUTPATIENT
Start: 2017-11-20 | End: 2017-11-20

## 2017-11-20 RX ORDER — HYDROMORPHONE HYDROCHLORIDE 1 MG/ML
0.2 INJECTION, SOLUTION INTRAMUSCULAR; INTRAVENOUS; SUBCUTANEOUS
Status: DISCONTINUED | OUTPATIENT
Start: 2017-11-20 | End: 2017-11-20 | Stop reason: HOSPADM

## 2017-11-20 RX ORDER — MIDAZOLAM HYDROCHLORIDE 1 MG/ML
INJECTION, SOLUTION INTRAMUSCULAR; INTRAVENOUS AS NEEDED
Status: DISCONTINUED | OUTPATIENT
Start: 2017-11-20 | End: 2017-11-20 | Stop reason: HOSPADM

## 2017-11-20 RX ORDER — ONDANSETRON 2 MG/ML
4 INJECTION INTRAMUSCULAR; INTRAVENOUS AS NEEDED
Status: DISCONTINUED | OUTPATIENT
Start: 2017-11-20 | End: 2017-11-20 | Stop reason: HOSPADM

## 2017-11-20 RX ORDER — HYDROMORPHONE HYDROCHLORIDE 2 MG/1
4 TABLET ORAL
Status: DISCONTINUED | OUTPATIENT
Start: 2017-11-20 | End: 2017-11-21

## 2017-11-20 RX ORDER — FENTANYL CITRATE 50 UG/ML
25 INJECTION, SOLUTION INTRAMUSCULAR; INTRAVENOUS
Status: DISCONTINUED | OUTPATIENT
Start: 2017-11-20 | End: 2017-11-20 | Stop reason: HOSPADM

## 2017-11-20 RX ORDER — TRAMADOL HYDROCHLORIDE 50 MG/1
50 TABLET ORAL
Status: DISCONTINUED | OUTPATIENT
Start: 2017-11-20 | End: 2017-11-22 | Stop reason: HOSPADM

## 2017-11-20 RX ORDER — CEFAZOLIN SODIUM/WATER 2 G/20 ML
2 SYRINGE (ML) INTRAVENOUS EVERY 8 HOURS
Status: COMPLETED | OUTPATIENT
Start: 2017-11-20 | End: 2017-11-21

## 2017-11-20 RX ORDER — SODIUM CHLORIDE 9 MG/ML
25 INJECTION, SOLUTION INTRAVENOUS CONTINUOUS
Status: DISCONTINUED | OUTPATIENT
Start: 2017-11-20 | End: 2017-11-20 | Stop reason: HOSPADM

## 2017-11-20 RX ORDER — CELECOXIB 200 MG/1
200 CAPSULE ORAL ONCE
Status: COMPLETED | OUTPATIENT
Start: 2017-11-20 | End: 2017-11-20

## 2017-11-20 RX ORDER — MIDAZOLAM HYDROCHLORIDE 1 MG/ML
1 INJECTION, SOLUTION INTRAMUSCULAR; INTRAVENOUS AS NEEDED
Status: DISCONTINUED | OUTPATIENT
Start: 2017-11-20 | End: 2017-11-20 | Stop reason: HOSPADM

## 2017-11-20 RX ORDER — KETOROLAC TROMETHAMINE 30 MG/ML
15 INJECTION, SOLUTION INTRAMUSCULAR; INTRAVENOUS EVERY 6 HOURS
Status: COMPLETED | OUTPATIENT
Start: 2017-11-20 | End: 2017-11-21

## 2017-11-20 RX ADMIN — SODIUM CHLORIDE, SODIUM LACTATE, POTASSIUM CHLORIDE, AND CALCIUM CHLORIDE: 600; 310; 30; 20 INJECTION, SOLUTION INTRAVENOUS at 11:30

## 2017-11-20 RX ADMIN — DOCUSATE SODIUM AND SENNOSIDES 1 TABLET: 8.6; 5 TABLET, FILM COATED ORAL at 17:22

## 2017-11-20 RX ADMIN — MIDAZOLAM HYDROCHLORIDE 2 MG: 1 INJECTION, SOLUTION INTRAMUSCULAR; INTRAVENOUS at 09:34

## 2017-11-20 RX ADMIN — TRAMADOL HYDROCHLORIDE 50 MG: 50 TABLET, FILM COATED ORAL at 16:04

## 2017-11-20 RX ADMIN — SODIUM CHLORIDE 125 ML/HR: 900 INJECTION, SOLUTION INTRAVENOUS at 17:22

## 2017-11-20 RX ADMIN — PROPOFOL 30 MCG/KG/MIN: 10 INJECTION, EMULSION INTRAVENOUS at 09:45

## 2017-11-20 RX ADMIN — Medication 2 G: at 17:22

## 2017-11-20 RX ADMIN — ACETAMINOPHEN 650 MG: 325 TABLET, FILM COATED ORAL at 16:04

## 2017-11-20 RX ADMIN — ONDANSETRON 4 MG: 2 INJECTION INTRAMUSCULAR; INTRAVENOUS at 12:45

## 2017-11-20 RX ADMIN — KETOROLAC TROMETHAMINE 15 MG: 30 INJECTION, SOLUTION INTRAMUSCULAR at 23:37

## 2017-11-20 RX ADMIN — Medication 2 G: at 09:53

## 2017-11-20 RX ADMIN — ACETAMINOPHEN 1000 MG: 500 TABLET, FILM COATED ORAL at 08:00

## 2017-11-20 RX ADMIN — SODIUM CHLORIDE 125 ML/HR: 900 INJECTION, SOLUTION INTRAVENOUS at 12:55

## 2017-11-20 RX ADMIN — KETOROLAC TROMETHAMINE 15 MG: 30 INJECTION, SOLUTION INTRAMUSCULAR at 17:22

## 2017-11-20 RX ADMIN — SODIUM CHLORIDE 125 ML/HR: 900 INJECTION, SOLUTION INTRAVENOUS at 21:51

## 2017-11-20 RX ADMIN — MIDAZOLAM HYDROCHLORIDE 2 MG: 1 INJECTION, SOLUTION INTRAMUSCULAR; INTRAVENOUS at 08:11

## 2017-11-20 RX ADMIN — CELECOXIB 200 MG: 200 CAPSULE ORAL at 08:00

## 2017-11-20 RX ADMIN — TRAMADOL HYDROCHLORIDE 50 MG: 50 TABLET, FILM COATED ORAL at 22:36

## 2017-11-20 RX ADMIN — SODIUM CHLORIDE, SODIUM LACTATE, POTASSIUM CHLORIDE, AND CALCIUM CHLORIDE 125 ML/HR: 600; 310; 30; 20 INJECTION, SOLUTION INTRAVENOUS at 07:56

## 2017-11-20 RX ADMIN — ASPIRIN 325 MG: 325 TABLET, DELAYED RELEASE ORAL at 17:22

## 2017-11-20 NOTE — PERIOP NOTES
TRANSFER - OUT REPORT:    Verbal report given to nayeli  on Henny Franco  being transferred to 576(unit) for routine post - op       Report consisted of patients Situation, Background, Assessment and   Recommendations(SBAR). Time Pre op antibiotic given:2 grams ancef 953   Anesthesia Stop time: 7910  Jason Present on Transfer to floor:y  Order for Jason on Chart:y    Information from the following report(s) SBAR, OR Summary, Procedure Summary, Intake/Output, MAR, Recent Results and Cardiac Rhythm nsr was reviewed with the receiving nurse. Opportunity for questions and clarification was provided. Is the patient on 02? NO       L/Min        Other     Is the patient on a monitor? NO    Is the nurse transporting with the patient? NO    Surgical Waiting Area notified of patient's transfer from PACU? YES      The following personal items collected during your admission accompanied patient upon transfer:   Dental Appliance: Dental Appliances: None  Vision: Visual Aid: Glasses  Hearing Aid:    Jewelry: Jewelry: None  Clothing: Clothing: Other (comment) (CLOTHING & SHOES TO PACU)  Other Valuables:  Other Valuables: None  Valuables sent to safe:      Clothing bag x 1 up with pt on stretcher

## 2017-11-20 NOTE — ANESTHESIA PROCEDURE NOTES
Spinal Block    Performed by: Milo Hermosillo  Authorized by: Milo Hermosillo     Pre-procedure:   Indications: at surgeon's request and primary anesthetic  Preanesthetic Checklist: patient identified, risks and benefits discussed, anesthesia consent, site marked, patient being monitored and timeout performed      Spinal Block:   Patient Position:  Right lateral decubitus  Prep Region:  Lumbar  Prep: Betadine      Location:  L3-4  Technique:  Single shot  Local:  Lidocaine 1%      Needle:   Needle Type:  Pencan  Needle Gauge:  25 G  Attempts:  1      Events: CSF confirmed, no blood with aspiration and no paresthesia        Assessment:  Insertion:  Uncomplicated  Patient tolerance:  Patient tolerated the procedure well with no immediate complications

## 2017-11-20 NOTE — ANESTHESIA PREPROCEDURE EVALUATION
Anesthetic History     Increased risk of difficult airway          Review of Systems / Medical History  Patient summary reviewed, nursing notes reviewed and pertinent labs reviewed    Pulmonary  Within defined limits                 Neuro/Psych   Within defined limits           Cardiovascular  Within defined limits                Exercise tolerance: >4 METS     GI/Hepatic/Renal     GERD           Endo/Other        Obesity and arthritis     Other Findings              Physical Exam    Airway  Mallampati: III  TM Distance: 4 - 6 cm  Neck ROM: normal range of motion   Mouth opening: Normal     Cardiovascular  Regular rate and rhythm,  S1 and S2 normal,  no murmur, click, rub, or gallop             Dental  No notable dental hx       Pulmonary  Breath sounds clear to auscultation               Abdominal  GI exam deferred       Other Findings            Anesthetic Plan    ASA: 2  Anesthesia type: spinal, MAC and regional          Induction: Intravenous  Anesthetic plan and risks discussed with: Patient           Anesthetic History   No history of anesthetic complications           Review of Systems / Medical History  Patient summary reviewed, nursing notes reviewed and pertinent labs reviewed    Pulmonary  Within defined limits               Neuro/Psych   Within defined limits      Psychiatric history     Cardiovascular  Within defined limits  Hypertension                 GI/Hepatic/Renal  Within defined limits                Endo/Other  Within defined limits      Arthritis     Other Findings            Physical Exam    Airway  Mallampati: II  TM Distance: > 6 cm  Neck ROM: normal range of motion   Mouth opening: Normal     Cardiovascular  Regular rate and rhythm,  S1 and S2 normal,  no murmur, click, rub, or gallop             Dental  No notable dental hx       Pulmonary  Breath sounds clear to auscultation               Abdominal  GI exam deferred       Other Findings            Anesthetic Plan    ASA: 2  Anesthesia type: spinal          Induction: Intravenous  Anesthetic plan and risks discussed with: Patient

## 2017-11-20 NOTE — ANESTHESIA POSTPROCEDURE EVALUATION
Post-Anesthesia Evaluation and Assessment    Patient: Carlos A Dukes MRN: [id number]  SSN: [id number]    YOB: 1940  Age: 69 y.o. Sex: female       Cardiovascular Function/Vital Signs  Visit Vitals    /63    Pulse 85    Temp 37 °C (98.6 °F)    Resp 20    Ht 5' 5.5\" (1.664 m)    Wt 81.8 kg (180 lb 6 oz)    SpO2 100%    BMI 29.56 kg/m2       Patient is status post spinal, MAC, regional anesthesia for Procedure(s):  RIGHT UNICONDYLAR KNEE ARTHROPLASTY  . Nausea/Vomiting: None    Postoperative hydration reviewed and adequate. Pain:  Pain Scale 1: Numeric (0 - 10) (11/20/17 1155)  Pain Intensity 1: 0 (11/20/17 1155)   Managed    Neurological Status:   Neuro (WDL): Exceptions to WDL (11/20/17 1155)  Neuro  Neurologic State: Alert (11/20/17 1155)  LUE Motor Response: Purposeful (11/20/17 1155)  LLE Motor Response: Pharmacologically paralyzed (11/20/17 1155)  RUE Motor Response: Purposeful (11/20/17 1155)  RLE Motor Response: Pharmacologically paralyzed (11/20/17 1155)   At baseline    Mental Status and Level of Consciousness: Arousable    Pulmonary Status:   O2 Device: Room air (11/20/17 1159)   Adequate oxygenation and airway patent    Complications related to anesthesia: None    Post-anesthesia assessment completed.  No concerns    Signed By: Marcos Salgado DO     November 20, 2017

## 2017-11-20 NOTE — PROGRESS NOTES
Primary Nurse Fae Rinne, RN and Estee Fofana RN performed a dual skin assessment on this patient No impairment noted  Migel score is 19

## 2017-11-20 NOTE — ANESTHESIA PROCEDURE NOTES
Peripheral Block    Performed by: Tyler Button  Authorized by: Tyler Button       Pre-procedure: Indications: at surgeon's request and post-op pain management    Preanesthetic Checklist: patient identified, risks and benefits discussed, site marked, timeout performed, anesthesia consent given and patient being monitored      Block Type:   Block Type:   Adductor canal  Laterality:  Right  Monitoring:  Heart rate, standard ASA monitoring, continuous pulse ox, responsive to questions, frequent vital sign checks and oxygen  Injection Technique:  Single shot  Procedures: ultrasound guided    Patient Position: supine  Prep: chlorhexidine    Location:  Mid thigh  Needle Type:  Stimuplex  Medication Injected:  0.5%  ropivacaine  Volume (mL):  20    Assessment:  Number of attempts:  1  Injection Assessment:  Incremental injection every 5 mL, local visualized surrounding nerve on ultrasound, negative aspiration for blood, no intravascular symptoms, no paresthesia and ultrasound image on chart  Patient tolerance:  Patient tolerated the procedure well with no immediate complications

## 2017-11-20 NOTE — ANESTHESIA PROCEDURE NOTES
Peripheral Block    Performed by: Joann Bailey  Authorized by: Delfina Vaughn I       Block Type:     Assessment:    Injection Assessment:           Correction to this chart. Guillermo Zamora CRNA did not perform this block.  Dr. Gordon Jimenez was the procedurist

## 2017-11-20 NOTE — PROGRESS NOTES
Problem: Mobility Impaired (Adult and Pediatric)  Goal: *Acute Goals and Plan of Care (Insert Text)  Physical Therapy Goals  Initiated 11/20/2017    1. Patient will move from supine to sit and sit to supine , scoot up and down and roll side to side in bed with independence within 4 days. 2. Patient will perform sit to stand with modified independence within 4 days. 3. Patient will ambulate with modified independence for 300 feet with the least restrictive device within 4 days. 4. Patient will ascend/descend 2 stairs with 2 handrail(s) with modified independence within 4 days. 5. Patient will perform home exercise program per protocol with independence within 4 days. 6. Patient will demonstrate AROM 0-90 degrees in operative joint within 4 days. physical Therapy EVALUATION  Patient: Jose Elias Meehan (26 y.o. female)  Date: 11/20/2017  Primary Diagnosis: M17.11 Unilateral primary osteoarthritis, right knee  Primary osteoarthritis of right knee  Procedure(s) (LRB):  RIGHT UNICONDYLAR KNEE ARTHROPLASTY   (Right) Day of Surgery   Precautions:   Fall, WBAT    ASSESSMENT :  Based on the objective data described below, the patient presents with mobility at an overall min assist to contact guard level for basic mobility s/p right uni knee replacement POD 0. She is s/p previous TKR just about 3 years ago but reports that she attend the pre op class again. Anticipate steady gains with right knee ROM and mobility allowing for discharge to home and [facility] for follow up. She reports a group effort of family members to assist her at home prn but her sister-in-law will be her . .    Patient will benefit from skilled intervention to address the above impairments.   Patients rehabilitation potential is considered to be Good  Factors which may influence rehabilitation potential include:   []         None noted  []         Mental ability/status  []         Medical condition  []         Home/family situation and support systems  []         Safety awareness  []         Pain tolerance/management  [x]         Other: reports that she does not like to take pain meds     PLAN :  Recommendations and Planned Interventions:  [x]           Bed Mobility Training             []    Neuromuscular Re-Education  [x]           Transfer Training                   []    Orthotic/Prosthetic Training  [x]           Gait Training                         []    Modalities  [x]           Therapeutic Exercises           []    Edema Management/Control  []           Therapeutic Activities            [x]    Patient and Family Training/Education  []           Other (comment):    Frequency/Duration: Patient will be followed by physical therapy  twice daily to address goals. Discharge Recommendations: Home Health  Further Equipment Recommendations for Discharge: none     SUBJECTIVE:   Patient stated I don't like to take pain medicine.     OBJECTIVE DATA SUMMARY:   Consult received, chart reviewed, pt cleared by nursing.   HISTORY:    Past Medical History:   Diagnosis Date    Arthritis     Bronchitis     Difficult intubation     NUMEROUS TMJ SURGERIES CAUSING DIFFICULT INTUBATION SOMETIMES    GERD (gastroesophageal reflux disease)     Psychiatric disorder     DEPRESSION     Past Surgical History:   Procedure Laterality Date    HX HEENT  1983-1988 X4    TMJ SURGERY X4    HX KNEE ARTHROSCOPY Right 2002    HX MOHS PROCEDURES Right 2007    RIGHT TEMPORAL SCALP    HX ORTHOPAEDIC Left 11/19/2014    PARTIAL KNEE REPLACEMENT    HX ROTATOR CUFF REPAIR Bilateral 2006    DR CURTIS    HX TUBAL LIGATION  1974     Prior Level of Function/Home Situation: independent   Personal factors and/or comorbidities impacting plan of care:     Home Situation  Home Environment: Private residence  # Steps to Enter: 2  Rails to Enter: Yes  Hand Rails : Bilateral  Wheelchair Ramp: Yes  One/Two Story Residence: One story  Living Alone: No  Support Systems: Child(apryl), Family member(s)  Patient Expects to be Discharged to[de-identified] Private residence  Current DME Used/Available at Home: Levonne Abelson, rolling, Walker, rollator, [place], straight, [place], quad, Grab bars, Hospital bed    EXAMINATION/PRESENTATION/DECISION MAKING:   Critical Behavior:  Neurologic State: Alert  Orientation Level: Oriented X4        Hearing: Auditory  Auditory Impairment: None  Skin:  Refer to MD and nursing notes, surgical bandage in place  Edema: none noted  Range Of Motion:  AROM: Generally decreased, functional                       Strength:    Strength: Generally decreased, functional                    Tone & Sensation:                  Sensation: Intact               Coordination:     Vision:      Functional Mobility:  Bed Mobility:     Supine to Sit: Minimum assistance  Sit to Supine: Minimum assistance     Transfers:  Sit to Stand: Minimum assistance;Assist x2  Stand to Sit: Contact guard assistance;Assist x1                       Balance:   Sitting: Intact  Standing: Intact; With support  Ambulation/Gait Training:  Distance (ft): 40 Feet (ft)  Assistive Device: Gait belt;Walker, rolling  Ambulation - Level of Assistance: Contact guard assistance        Gait Abnormalities: Decreased step clearance  Right Side Weight Bearing: As tolerated     Base of Support: Narrowed     Speed/Fariba: Slow                        Stairs: Therapeutic Exercises:   Pt instructed in ankle pumps, quad sets and heel slides     Functional Measure:  Timed up and go:    Timed Get Up And Go Test: 0 (unable without assist)     Timed Up and Go and G-code impairment scale:  Percentage of Impairment CH    0%   CI    1-19% CJ    20-39% CK    40-59% CL    60-79% CM    80-99% CN     100%   Timed   Score 0-56 10 11-12 13-14 15-16 17-18 19 20       < than 10 seconds=Normal  Greater then 13.5 seconds (in elderly)=Increased fall risk   Keyla Garcia [place] M.  Predicting the probability for falls in community dwelling older adults using the Timed Up and Go Test. Phys Ther. 2000;80:896-903. G codes: In compliance with CMSs Claims Based Outcome Reporting, the following G-code set was chosen for this patient based on their primary functional limitation being treated: The outcome measure chosen to determine the severity of the functional limitation was the TUG with a score of 0 which was correlated with the impairment scale. ? Mobility - Walking and Moving Around:     - CURRENT STATUS: CN - 100% impaired, limited or restricted    - GOAL STATUS: CI - 1%-19% impaired, limited or restricted    - D/C STATUS:  ---------------To be determined---------------      Physical Therapy Evaluation Charge Determination   History Examination Presentation Decision-Making   MEDIUM  Complexity : 1-2 comorbidities / personal factors will impact the outcome/ POC  LOW Complexity : 1-2 Standardized tests and measures addressing body structure, function, activity limitation and / or participation in recreation  LOW Complexity : Stable, uncomplicated  LOW Complexity : FOTO score of       Based on the above components, the patient evaluation is determined to be of the following complexity level: LOW     Pain:  Pain Scale 1: Numeric (0 - 10)  Pain Intensity 1: 7, alerted her nurse of request for pain meds  Pain Location 1: Knee  Pain Orientation 1: Right  Pain Description 1: Aching; Sharp     Activity Tolerance:   VSS  Please refer to the flowsheet for vital signs taken during this treatment. After treatment:   []         Patient left in no apparent distress sitting up in chair  [x]         Patient left in no apparent distress in bed  [x]         Call bell left within reach  [x]         Nursing notified  []         Caregiver present  []         Bed alarm activated    COMMUNICATION/EDUCATION:   The patients plan of care was discussed with: Registered Nurse.   [x]         Fall prevention education was provided and the patient/caregiver indicated understanding. [x]         Patient/family have participated as able in goal setting and plan of care. [x]         Patient/family agree to work toward stated goals and plan of care. []         Patient understands intent and goals of therapy, but is neutral about his/her participation. []         Patient is unable to participate in goal setting and plan of care.     Thank you for this referral.  Princess Lyons   Time Calculation: 20 mins

## 2017-11-20 NOTE — OP NOTES
Name: Nikole Huerta  MRN:  [id number]  : 1940  Age:  69 y.o. Surgery Date: 2017      OPERATIVE REPORT   RIGHT UNCOMPARTMENTAL KNEE REPLACEMENT    PREOPERATIVE DIAGNOSIS: Osteoarthritis medial compartment, right knee. POSTOPERATIVE DIAGNOSIS: Osteoarthritis medial compartment, right knee. PROCEDURE PERFORMED: Right unicompartmental knee arthroplasty. SURGEON: Arash Garcia MD    FIRST ASSISTANT:  Penny Perez    ANESTHESIA: Spinal    PRE-OP ANTIBIOTIC: Ancef 2g    COMPLICATIONS: None. ESTIMATED BLOOD LOSS: 50 mL. SPECIMENS REMOVED: None. COMPONENTS IMPLANTED:   Implant Name Type Inv. Item Serial No.  Lot No. LRB No. Used Action   CEMENT BNE [employer] GHV 40GM -- SMARTSET - SN/A  CEMENT BNE G[employer]AM[employer] GHV 40GM -- SMARTSET N/A [facility] [employer] [id number] Right 1 Implanted   TIBIAL TRAY PLATFORM SIZE RIGHT 2 Joint Component  [id number] [address] [id number] Right 1 Implanted   FEMORAL COMPONENT SIZE 4 RIGHT Joint Component  [id number] [address] [id number] Right 1 Implanted   TIBIAL INSERT SIZE 2 RIGHT 9 Joint Component   [id number] [address] [id number] Right 1 Implanted       INDICATIONS: The patient is an 69 yrs female with progressive debilitating right knee pain due to severe osteoarthritis. Symptoms have progressed despite comprehensive conservative treatment and the patient presents for right unicompartmental knee replacement. Risks, benefits, alternatives of the procedure were reviewed in detail and the patient elects to proceed. The patient understands the increased risk for perioperative medical complications. DESCRIPTION OF PROCEDURE: Anesthetic was initiated. Preoperative dose of antibiotic was given. Jason catheter was placed. Right side was confirmed as the operative side, prepped and draped in the usual sterile fashion. Skin was covered with Ioban occlusive dressing. Medial parapatellar approach was made to the right knee. The knee was examined.  Severe medial compartment disease. No issues on the lateral or patellofemoral joints. The knee was exposed. The extramedullary alignment guide was used to make a proximal tibial cut. The femur was cut parallel to the tibia with a spacer block technique. The femur was sized for and 4. The cutting block was placed and provisionally pinned, examined the flexion gap, translated the femoral component as far lateral as possible and created a rectangular flexion gap with rotation. The femur was pinned and finished for an 4, tibia was prepared for a 2. The meniscus was removed. Bony surfaces were drilled and the sclerotic areas lavaged and dried the bony surfaces and implanted a size 2 tibia, size 4 femur, and a 9 mm poly. Cement was allowed to fully cure with no motion and all excess cement was removed. At this point, the knee was ranged, irrigated copiously with pulsatile lavage. Soft tissues were infiltrated with local anesthetic. The arthrotomy was closed with #2 Vicryl sutures and 0 Vicryl sutures. Deep wound was again irrigated. Skin and subcutaneous were closed in standard fashion. Sterile dressing was applied. There were no complications. No specimen was sent. Procedure was unicompartmental arthroplasty, right knee. Mike Diaz was of vital assistance during the procedure. The patient was taken to the recovery room in good condition.     Jhony Berry MD

## 2017-11-20 NOTE — IP AVS SNAPSHOT
[address] 
[phone] Patient: Xavier Estrada MRN: [id number] LKI:5[birth year] About your hospitalization You were admitted on:  November 20, 2017 [facility] on:  November 22, 2017 Why you were hospitalized Your primary diagnosis was:  Primary Osteoarthritis Of Right Knee Things You Need To Do (next 8 weeks) Follow up with Britney Steele MD  
  
Phone:  [phone] Where:  [address] [place] [address] [facility] Dis[facility] Orders None A check svetlana indicates which time of day the medication should be taken. My Medications TAKE these medications as instructed Instructions Each Dose to Equal  
 Morning Noon Evening Bedtime  
 aspirin delayed-release 325 mg tablet Your last dose was: Your next dose is: Take 1 Tab by mouth every twelve (12) hours. Indications: Prevention of Blood Clots after Partial Knee Replacement 325 mg CALCIUM 600 WITH VITAMIN D3 600 mg(1,500mg) -400 unit Cap Generic drug:  Calcium-Cholecalciferol (D3) Your last dose was: Your next dose is: Take  by mouth every Monday, Wednesday, Friday. calcium carbonate 200 mg calcium (500 mg) Chew Commonly known as:  TUMS Your last dose was: Your next dose is: Take 1 Tab by mouth as needed. 1 Tab HYDROcodone-acetaminophen 5-325 mg per tablet Commonly known as:  Linsey Gee Your last dose was: Your next dose is: Take 0.5-1 Tabs by mouth every four (4) hours as needed. Max Daily Amount: 6 Tabs. Indications: Severe Incisional Knee Pain  
 0.5-1 Tab VITAMIN B COMPLEX 100 #2-HERBS PO Your last dose was: Your next dose is: Take  by mouth every Monday, Wednesday, Friday. VITAMIN C 500 mg tablet Generic drug:  ascorbic acid (vitamin C) Your last dose was: Your next dose is: Take  by mouth every Monday, Wednesday, Friday. Where to Get Your Medications Information on where to get these meds will be given to you by the nurse or doctor. ! Ask your nurse or doctor about these medications  
  aspirin delayed-release 325 mg tablet HYDROcodone-acetaminophen 5-325 mg per tablet Discharge Instructions Patient meets criteria for BUNDLED PAYMENT  
for Care Improvement Initiative Criteria Contact Information for Orthopedic Nurse Navigator:     
MIRELA Argueta, RN-BC 
R:[phone] K:[phone] Y:[phone] After Hospital Care Plan:  Discharge Instructions Unicondylar Knee Replacement Dr. Marcel Cuevas Patient Name: Crow Mccain Date of procedure: 11/20/2017 Procedure: Procedure(s): RIGHT UNICONDYLAR KNEE ARTHROPLASTY Surgeon: Surgeon(s) and Role: Bennie Reyes MD - Primary PCP: Luis E Dai MD 
Date of discharge: No discharge date for patient encounter. Follow up appointments ? Follow up with Dr. Marcel Cuevas in 3 weeks. Call [phone] to make an appointment. ? If home health has been arranged for you the agency will contact you to arrange dates/times for visits. Please call them if you do not hear from them within 24 hours after you are discharged When to call your Orthopaedic Surgeon: Call [phone]. If you call after 5pm or on a weekend, the on call physician will be contacted ? Unrelieved pain ? Signs of infection-if your incision is red; continues to have drainage; drainage has a foul odor or if you have a persistent fever over 101 degrees ? Signs of a blood clot in your leg-calf pain, tenderness, redness, swelling of lower leg When to call your Primary Care Physician: 
? Concerns about medical conditions such as diabetes, high blood pressure, asthma, congestive heart failure ? Call if blood sugars are elevated, persistent headache or dizziness, coughing or congestion, constipation or diarrhea, burning with urination, abnormal heart rate When to call 911and go to the nearest emergency room ? Acute onset of chest pain, shortness of breath, difficulty breathing Activity ? Weight bearing as tolerated with walker or crutches. Refer to pages 23-31 of your handbook for instructions and pictures ? Complete your Home Exercise Program daily as instructed by your therapist.  Refer to pages 33-41 of your handbook for instructions and pictures ? Get up every one hour and walk (except at night when sleeping) ? Do not drive or operate heavy machinery Incision Care ? The Aquacel (brown, waterproof) surgical dressing is to remain on your knee for 7 days. On the 7th day have someone gently peel the dressing off by carefully lifting the edge and stretching it slightly to break the adhesive seal and leave incision open to air. You may take a shower with the Aquacel dressing in place. ? You have staples in your knee incision; they will be removed by the Home Health staff in 10-14 days and steri-strips will be applied. Leave the steri-strips on until they fall off Preventing blood clots ? Take Enteric Coated  Aspirin 325 mg twice a day (with food) for one month following surgery. Pain management ? Take pain medication as prescribed with food & full glass of fluid; decrease the amount you use as your pain lessens ? Avoid alcoholic beverages while taking pain medication ? Do not take any over-the-counter medication for pain except Tylenol (acetaminophen) ? Please be aware that many medications contain Tylenol. We do not want you to over medicate so please read the information below as a guide.   Do not take more than 2.5 Grams of Tylenol in a 24 hour period. (There are 1000 milligrams in one Gram) 
o 325 mg of Tylenol per tablet (do not take more than 7 tablets in 24 hours) 
o 500 mg of Tylenol per tablet (do not take more than 5 tablets in 24 hours) ? Elevate your leg (do not bend/flex knee) and place ice bags on your knee for 15-20 minutes after exercising and as needed throughout the day and night Diet ? Resume usual diet; drink plenty of fluids; eat foods high in fiber ? You may want to take a stool softener (such as Senokot-S or Colace) to prevent constipation while you are taking pain medication. If constipation occurs, take a laxative (such as Dulcolax tablets, Milk of Magnesia, or a suppository) Home Health Care Protocol (to be followed by [address]) Nursing-per physicians order ? Remove staples 10-14 days post op and apply steri-strips ? Remove Aquacel dressing at 7 days post-op ? Complete head to toe assessment, vital signs ? Medication reconciliation ? Review pain management ? Manage chronic medical conditions Physical Therapy-per physicians order Weight bearing status: 
Precautions at Admission: Fall, WBAT Right Side Weight Bearing: As tolerated Mobility Status: 
Supine to Sit: Minimum assistance Sit to Stand: Stand-by asssistance Sit to Supine: Minimum assistance Gait: 
Distance (ft): 150 Feet (ft) Ambulation - Level of Assistance: Stand-by asssistance Assistive Device: Gait belt, Walker, rolling Gait Abnormalities: Antalgic, Decreased step clearance ADL status overall composite: Toilet Transfer : Stand-by asssistance Physical Therapy ? Assessment and evaluation-bed mobility; functional transfers (bed, chair, bathroom, stairs); ambulation with equipment, car transfers, shower transfers, safety and ability to get out of house in the event of an emergency ? Review and maintain weight bearing as tolerated ? Discuss pain management ? Review how to do ADLs. Refer to page 42 of patient handbook Home Exercise Program-refer to pages 33-41 of the patient handbook for exercises Introducing [facility]! [facility] introduces [employer] patient portal. Now you can access parts of your medical record, email your doctor's office, and request medication refills online. 1. In your internet browser, go to [email or web address] 2. Click on the First Time User? Click Here link in the Sign In box. You will see the New Member Sign Up page. 3. Enter your [employer] Access Code exactly as it appears below. You will not need to use this code after youve completed the sign-up process. If you do not sign up before the expiration date, you must request a new code. · [employer] Access Code: [email or web address] Expires: 11/25/2017 12:48 PM 
 
4. Enter the last four digits of your Social Security Number (xxxx) and Date of Birth (mm/dd/yyyy) as indicated and click Submit. You will be taken to the next sign-up page. 5. Create a [employer] ID. This will be your [employer] login ID and cannot be changed, so think of one that is secure and easy to remember. 6. Create a [employer] password. You can change your password at any time. 7. Enter your Password Reset Question and Answer. This can be used at a later time if you forget your password. 8. Enter your e-mail address. You will receive e-mail notification when new information is available in [address]. 9. Click Sign Up. You can now view and download portions of your medical record. 10. Click the Download Summary menu link to download a portable copy of your medical information. If you have questions, please visit the Frequently Asked Questions section of the [employer] website. Remember, [employer] is NOT to be used for urgent needs. For medical emergencies, dial 911. Now available from your iPhone and Android! Providers Seen During Your Hospitalization Provider Specialty Primary office phone Dk Gotti MD Orthopedic Surgery [phone] Your Primary Care Physician (PCP) Primary Care Physician Office Phone Office Walter Mohamud [phone] You are allergic to the following Allergen Reactions Augmentin (Amoxicillin-Pot Clavulanate) Itching Codeine Palpitations Recent Documentation Height Weight Breastfeeding? BMI OB Status Smoking Status 1.664 m 81.8 kg No 29.56 kg/m2 Postmenopausal Never Smoker Emergency Contacts Name Discharge Info Relation Home Work Mobile Ita Chandlerberly DISCHARGE CAREGIVER [3] Child [2] [phone] Patient Belongings The following personal items are in your possession at time of discharge: 
  Dental Appliances: None  Visual Aid: Glasses      Home Medications: None   Jewelry: None  Clothing: Other (comment) (CLOTHING & SHOES TO PACU)    Other Valuables: None Please provide this summary of care documentation to your next provider. Signatures-by signing, you are acknowledging that this After Visit Summary has been reviewed with you and you have received a copy. Patient Signature:  ____________________________________________________________ Date:  ____________________________________________________________  
  
Alize Funk Provider Signature:  ____________________________________________________________ Date:  ____________________________________________________________

## 2017-11-20 NOTE — IP AVS SNAPSHOT
[address] 
[phone] Patient: Reema Mahan MRN: [id number] BCK:8/0/2923 My Medications TAKE these medications as instructed Instructions Each Dose to Equal  
 Morning Noon Evening Bedtime  
 aspirin delayed-release 325 mg tablet Your last dose was: Your next dose is: Take 1 Tab by mouth every twelve (12) hours. Indications: Prevention of Blood Clots after Partial Knee Replacement 325 mg CALCIUM 600 WITH VITAMIN D3 600 mg(1,500mg) -400 unit Cap Generic drug:  Calcium-Cholecalciferol (D3) Your last dose was: Your next dose is: Take  by mouth every Monday, Wednesday, Friday. calcium carbonate 200 mg calcium (500 mg) Chew Commonly known as:  TUMS Your last dose was: Your next dose is: Take 1 Tab by mouth as needed. 1 Tab HYDROcodone-acetaminophen 5-325 mg per tablet Commonly known as:  Kathy Ann Your last dose was: Your next dose is: Take 0.5-1 Tabs by mouth every four (4) hours as needed. Max Daily Amount: 6 Tabs. Indications: Severe Incisional Knee Pain  
 0.5-1 Tab VITAMIN B COMPLEX 100 #2-HERBS PO Your last dose was: Your next dose is: Take  by mouth every Monday, Wednesday, Friday. VITAMIN C 500 mg tablet Generic drug:  ascorbic acid (vitamin C) Your last dose was: Your next dose is: Take  by mouth every Monday, Wednesday, Friday. Where to Get Your Medications Information on where to get these meds will be given to you by the nurse or doctor. ! Ask your nurse or doctor about these medications  
  aspirin delayed-release 325 mg tablet HYDROcodone-acetaminophen 5-325 mg per tablet

## 2017-11-21 LAB
ANION GAP SERPL CALC-SCNC: 8 MMOL/L (ref 5–15)
BUN SERPL-MCNC: 12 MG/DL (ref 6–20)
BUN/CREAT SERPL: 14 (ref 12–20)
CALCIUM SERPL-MCNC: 8 MG/DL (ref 8.5–10.1)
CHLORIDE SERPL-SCNC: 110 MMOL/L (ref 97–108)
CO2 SERPL-SCNC: 25 MMOL/L (ref 21–32)
CREAT SERPL-MCNC: 0.85 MG/DL (ref 0.55–1.02)
GLUCOSE SERPL-MCNC: 97 MG/DL (ref 65–100)
HGB BLD-MCNC: 12.4 G/DL (ref 11.5–16)
POTASSIUM SERPL-SCNC: 4 MMOL/L (ref 3.5–5.1)
SODIUM SERPL-SCNC: 143 MMOL/L (ref 136–145)

## 2017-11-21 PROCEDURE — 97165 OT EVAL LOW COMPLEX 30 MIN: CPT

## 2017-11-21 PROCEDURE — 36415 COLL VENOUS BLD VENIPUNCTURE: CPT | Performed by: ORTHOPAEDIC SURGERY

## 2017-11-21 PROCEDURE — 74011250637 HC RX REV CODE- 250/637: Performed by: ORTHOPAEDIC SURGERY

## 2017-11-21 PROCEDURE — 97116 GAIT TRAINING THERAPY: CPT

## 2017-11-21 PROCEDURE — 97535 SELF CARE MNGMENT TRAINING: CPT

## 2017-11-21 PROCEDURE — 80048 BASIC METABOLIC PNL TOTAL CA: CPT | Performed by: ORTHOPAEDIC SURGERY

## 2017-11-21 PROCEDURE — 74011250637 HC RX REV CODE- 250/637: Performed by: NURSE PRACTITIONER

## 2017-11-21 PROCEDURE — 97110 THERAPEUTIC EXERCISES: CPT

## 2017-11-21 PROCEDURE — 74011250636 HC RX REV CODE- 250/636: Performed by: ORTHOPAEDIC SURGERY

## 2017-11-21 PROCEDURE — 65270000029 HC RM PRIVATE

## 2017-11-21 PROCEDURE — G8988 SELF CARE GOAL STATUS: HCPCS

## 2017-11-21 PROCEDURE — G8987 SELF CARE CURRENT STATUS: HCPCS

## 2017-11-21 PROCEDURE — 85018 HEMOGLOBIN: CPT | Performed by: ORTHOPAEDIC SURGERY

## 2017-11-21 RX ORDER — HYDROCODONE BITARTRATE AND ACETAMINOPHEN 5; 325 MG/1; MG/1
1 TABLET ORAL
Status: DISCONTINUED | OUTPATIENT
Start: 2017-11-21 | End: 2017-11-22 | Stop reason: HOSPADM

## 2017-11-21 RX ORDER — HYDROMORPHONE HYDROCHLORIDE 2 MG/1
1-2 TABLET ORAL
Status: DISCONTINUED | OUTPATIENT
Start: 2017-11-21 | End: 2017-11-22 | Stop reason: HOSPADM

## 2017-11-21 RX ORDER — ACETAMINOPHEN 325 MG/1
325-650 TABLET ORAL
Status: DISCONTINUED | OUTPATIENT
Start: 2017-11-21 | End: 2017-11-22 | Stop reason: HOSPADM

## 2017-11-21 RX ORDER — HYDROCODONE BITARTRATE AND ACETAMINOPHEN 7.5; 325 MG/1; MG/1
1 TABLET ORAL
Status: DISCONTINUED | OUTPATIENT
Start: 2017-11-21 | End: 2017-11-22 | Stop reason: HOSPADM

## 2017-11-21 RX ORDER — ACETAMINOPHEN 500 MG
500 TABLET ORAL
Status: DISCONTINUED | OUTPATIENT
Start: 2017-11-21 | End: 2017-11-22

## 2017-11-21 RX ADMIN — Medication 2 G: at 02:37

## 2017-11-21 RX ADMIN — Medication 10 ML: at 21:43

## 2017-11-21 RX ADMIN — ACETAMINOPHEN 500 MG: 500 TABLET, FILM COATED ORAL at 09:56

## 2017-11-21 RX ADMIN — HYDROCODONE BITARTRATE AND ACETAMINOPHEN 1 TABLET: 7.5; 325 TABLET ORAL at 21:43

## 2017-11-21 RX ADMIN — ASPIRIN 325 MG: 325 TABLET, DELAYED RELEASE ORAL at 08:22

## 2017-11-21 RX ADMIN — TRAMADOL HYDROCHLORIDE 50 MG: 50 TABLET, FILM COATED ORAL at 19:30

## 2017-11-21 RX ADMIN — TRAMADOL HYDROCHLORIDE 50 MG: 50 TABLET, FILM COATED ORAL at 13:26

## 2017-11-21 RX ADMIN — KETOROLAC TROMETHAMINE 15 MG: 30 INJECTION, SOLUTION INTRAMUSCULAR at 06:57

## 2017-11-21 RX ADMIN — Medication 5 ML: at 06:00

## 2017-11-21 RX ADMIN — DOCUSATE SODIUM AND SENNOSIDES 1 TABLET: 8.6; 5 TABLET, FILM COATED ORAL at 08:22

## 2017-11-21 RX ADMIN — POLYETHYLENE GLYCOL 3350 17 G: 17 POWDER, FOR SOLUTION ORAL at 08:22

## 2017-11-21 RX ADMIN — ASPIRIN 325 MG: 325 TABLET, DELAYED RELEASE ORAL at 21:43

## 2017-11-21 RX ADMIN — TRAMADOL HYDROCHLORIDE 50 MG: 50 TABLET, FILM COATED ORAL at 06:58

## 2017-11-21 RX ADMIN — DOCUSATE SODIUM AND SENNOSIDES 1 TABLET: 8.6; 5 TABLET, FILM COATED ORAL at 17:28

## 2017-11-21 RX ADMIN — KETOROLAC TROMETHAMINE 15 MG: 30 INJECTION, SOLUTION INTRAMUSCULAR at 12:22

## 2017-11-21 RX ADMIN — ACETAMINOPHEN 500 MG: 500 TABLET, FILM COATED ORAL at 13:26

## 2017-11-21 RX ADMIN — ACETAMINOPHEN 500 MG: 500 TABLET, FILM COATED ORAL at 17:28

## 2017-11-21 NOTE — PROGRESS NOTES
Cm, reviewed chart and noted patient had right knee replacement yesterday and plans to be discharged tomorrow. Patient had left knee replaced 3 years ago. PCP is Dr Andreea Perez and patient has office visits as needed. No AMD in chart,    Cm met with patient and daughter, Kam Fagan [phone] in patient's room to introduce self and explain role. Patient was alert and oriented. Confirmed the above and that she lives in her own one level home on family property-- her daughter and sons live in homes on the property. Patient will have assistance at home when discharged. Patient was independent and self care prior to admission. Has dme--rollator, RW, quad cane, and hospital bed. Patient is in agreement with [facility]   [address] as she has been serviced by the agency in the past.  Referral sent via [employer] and accepted. Name and number placed on discharge instructions and patient advised to call agency if not contacted by noon the day after discharge to inquire as to the day and time of initial visit. Family will transport home-- Cm will remain available to assist with any needs that may arise. Care Management Interventions  PCP Verified by CM: Yes (OV as needed)  Mode of Transport at Discharge:  (car with family)  Transition of Care Consult (CM Consult): [address]: No  Reason Outside [place]:  (patient choice)  Physical Therapy Consult: Yes  Occupational Therapy Consult: Yes  Current Support Network: Own Home (lives alone in one level home on family property. Rosa Farah daughter and sons live in their own  kar[place] dis[place] the property.   Good family support  No AMD in chart)  Confirm Follow Up Transport: Family  Plan discussed with Pt/Family/Caregiver: Yes  Freedom of Choice Offered: Yes  Discharge Location  Discharge Placement: Home with home health

## 2017-11-21 NOTE — PROGRESS NOTES
Problem: Mobility Impaired (Adult and Pediatric)  Goal: *Acute Goals and Plan of Care (Insert Text)  Physical Therapy Goals  Initiated 11/20/2017    1. Patient will move from supine to sit and sit to supine , scoot up and down and roll side to side in bed with independence within 4 days. 2. Patient will perform sit to stand with modified independence within 4 days. 3. Patient will ambulate with modified independence for 300 feet with the least restrictive device within 4 days. 4. Patient will ascend/descend 2 stairs with 2 handrail(s) with modified independence within 4 days. 5. Patient will perform home exercise program per protocol with independence within 4 days. 6. Patient will demonstrate AROM 0-90 degrees in operative joint within 4 days. physical Therapy TREATMENT  Patient: Mary Lou Willett (64 y.o. female)  Date: 11/21/2017  Diagnosis: M17.11 Unilateral primary osteoarthritis, right knee  Primary osteoarthritis of right knee Primary osteoarthritis of right knee  Procedure(s) (LRB):  RIGHT UNICONDYLAR KNEE ARTHROPLASTY   (Right) 1 Day Post-Op  Precautions: Fall, WBAT    ASSESSMENT:  Patient received up in chair, finished eating lunch. Patient overall SBA for transfers with RW. Improved ambulation to 150 feet with RW and SBA. Patient with improved step through gait although remains antalgic. Improved TKE and heel strike with consistent cues. Patient continues to rely heavily on RW for balance. Returned to supine with min A. Patient plans to discharge tomorrow, anticipating clearing tomorrow morning as patient does not have stairs to navigate. Recommend HHPT. Progression toward goals:  [x]      Improving appropriately and progressing toward goals  []      Improving slowly and progressing toward goals  []      Not making progress toward goals and plan of care will be adjusted     PLAN:  Patient continues to benefit from skilled intervention to address the above impairments.   Continue treatment per established plan of care. Discharge Recommendations:  Home Health  Further Equipment Recommendations for Discharge: Owns RW     SUBJECTIVE:   My family need to get my walker out of my attic. OBJECTIVE DATA SUMMARY:   Range of Motion:                          Functional Mobility Training:  Bed Mobility:     Supine to Sit: Minimum assistance; Additional time  Sit to Supine: Minimum assistance           Transfers:  Sit to Stand: Stand-by asssistance  Stand to Sit: Stand-by asssistance  Stand Pivot Transfers: Stand-by asssistance                          Ambulation/Gait Training:  Distance (ft): 150 Feet (ft)  Assistive Device: Gait belt;Walker, rolling  Ambulation - Level of Assistance: Stand-by asssistance        Gait Abnormalities: Antalgic;Decreased step clearance  Right Side Weight Bearing: As tolerated        Stance: Right decreased  Speed/Fariba: Pace decreased (<100 feet/min)  Step Length: Right shortened;Left shortened                    Stairs: Therapeutic Exercises:   Exercises performed per protocol. See morning treatment note for description. Pain:                    Activity Tolerance:   Good  Please refer to the flowsheet for vital signs taken during this treatment.   After treatment:   [] Patient left in no apparent distress sitting up in chair  [x] Patient left in no apparent distress in bed  [x] Call bell left within reach  [x] Nursing notified  [] Caregiver present  [] Bed alarm activated    COMMUNICATION/COLLABORATION:   The patients plan of care was discussed with: Registered Nurse    Rancho Baxter, PT   Time Calculation: 15 mins

## 2017-11-21 NOTE — PROGRESS NOTES
NP ORTHO PROGRESS NOTE    Admit date: 11/20/2017  Date of Surgery: 11/20/2017   Procedures: Procedure(s):  RIGHT UNICONDYLAR KNEE ARTHROPLASTY    Admitting Physician: Marylee Heidelberg, MD   Surgeon: Jessenia Jacob) and Role:     * Marylee Heidelberg, MD - Primary    Chart/Meds/Labs Reviewed  Current Facility-Administered Medications   Medication Dose Route Frequency    acetaminophen (TYLENOL) tablet 500 mg  500 mg Oral Q4HWA    HYDROmorphone (DILAUDID) tablet 1-2 mg  1-2 mg Oral Q4H PRN    acetaminophen (TYLENOL) tablet 325-650 mg  325-650 mg Oral Q6H PRN    0.9% sodium chloride infusion  125 mL/hr IntraVENous CONTINUOUS    sodium chloride (NS) flush 5-10 mL  5-10 mL IntraVENous Q8H    sodium chloride (NS) flush 5-10 mL  5-10 mL IntraVENous PRN    naloxone (NARCAN) injection 0.4 mg  0.4 mg IntraVENous PRN    senna-docusate (PERICOLACE) 8.6-50 mg per tablet 1 Tab  1 Tab Oral BID    polyethylene glycol (MIRALAX) packet 17 g  17 g Oral DAILY    [START ON 11/22/2017] bisacodyl (DULCOLAX) suppository 10 mg  10 mg Rectal DAILY PRN    traMADol (ULTRAM) tablet 50 mg  50 mg Oral Q6H PRN    ketorolac (TORADOL) injection 15 mg  15 mg IntraVENous Q6H    HYDROmorphone (PF) (DILAUDID) injection 0.5 mg  0.5 mg IntraVENous Q4H PRN    aspirin delayed-release tablet 325 mg  325 mg Oral Q12H       Subjective:    Complaints: A bit a incisional pain. She's  just concerned that she's making slow progress--will need to stay tonight, discussed already with Dr. Judson Carranza. Denies Dizziness, CP, SOB, N/V, Abdominal pain, numbness or tingling of extremities. Able to perform ankle pumps easily. Progressing with mobility. Incisional pain control: Well controlled as expected postop. Oral diet: Tolerating diet well    Objective:  General: Alert,Ox4, cooperative, NAD  HEENT: Atraumatic, PERRL, anicteric sclerae  Lungs: Bilateral expansion. Equal excursion. No accessory muscle use.    Gastrointestinal:  Soft, non-tender, non-distended  Extremities:  Neurovasc exam WDL. + DP pulses. Sensation intact to light touch. Motor: + DF/PF          Calves non-tender upon palpation or with passive stretch. No significant erythema or swelling. Bulky over-wrapped  Dressing: clean, dry and intact.     Vital Signs:   Visit Vitals    /69    Pulse 95    Temp 97.7 °F (36.5 °C)    Resp 15    Ht 5' 5.5\" (1.664 m)    Wt 81.8 kg (180 lb 6 oz)    SpO2 95%    Breastfeeding No    BMI 29.56 kg/m2    O2 Flow Rate (L/min): 2 l/min O2 Device: Room air   Patient Vitals for the past 24 hrs:   BP Temp Pulse Resp SpO2   17 0821 140/69 97.7 °F (36.5 °C) 95 15 95 %   17 0508 134/79 97.6 °F (36.4 °C) 74 14 100 %   17 2334 134/80 98 °F (36.7 °C) 92 14 100 %   17 1947 133/72 97.4 °F (36.3 °C) 80 16 100 %   17 1725 118/60 - 87 15 99 %   17 1553 121/65 - 87 - -   17 1550 130/75 - 77 - -   17 1543 126/75 - 79 - 98 %   17 1457 129/79 97.5 °F (36.4 °C) 76 16 100 %   17 1330 130/73 97.5 °F (36.4 °C) 77 15 100 %   17 1315 123/57 - 69 14 97 %   17 1300 125/58 - 73 14 96 %   17 1247 - 97.6 °F (36.4 °C) - - -   17 1245 130/56 - 75 19 99 %   17 1230 123/57 - 79 18 99 %   17 1215 110/64 - 81 19 99 %   17 1210 111/76 - 83 18 99 %   17 1205 116/63 - 85 20 100 %   17 1200 93/78 - 99 20 99 %   17 1159 104/50 98.6 °F (37 °C) 94 16 99 %   17 1155 104/50 - 93 15 98 %     Temp (24hrs), Av.7 °F (36.5 °C), Min:97.4 °F (36.3 °C), Max:98.6 °F (37 °C)      Intake/output-last 8 hours:        Intake/output- 24 hours:   1901 -  0700  In: 2814.6 [I.V.:2814.6]  Out: 6206 [Urine:2810]    LAB:   Recent Results (from the past 24 hour(s))   METABOLIC PANEL, BASIC    Collection Time: 17  2:39 AM   Result Value Ref Range    Sodium 143 136 - 145 mmol/L    Potassium 4.0 3.5 - 5.1 mmol/L    Chloride 110 (H) 97 - 108 mmol/L CO2 25 21 - 32 mmol/L    Anion gap 8 5 - 15 mmol/L    Glucose 97 65 - 100 mg/dL    BUN 12 6 - 20 MG/DL    Creatinine 0.85 0.55 - 1.02 MG/DL    BUN/Creatinine ratio 14 12 - 20      GFR est AA >60 >60 ml/min/1.73m2    GFR est non-AA >60 >60 ml/min/1.73m2    Calcium 8.0 (L) 8.5 - 10.1 MG/DL   HEMOGLOBIN    Collection Time: 11/21/17  2:39 AM   Result Value Ref Range    HGB 12.4 11.5 - 16.0 g/dL      Lab Results   Component Value Date/Time    INR 1.0 10/30/2017 12:29 PM    INR 1.0 11/21/2014 02:29 AM    INR 1.0 11/20/2014 12:51 AM    INR 1.0 11/03/2014 09:12 AM     Lab Results   Component Value Date/Time    HGB 12.4 11/21/2017 02:39 AM    HGB 13.4 10/30/2017 12:29 PM    HGB 12.4 11/21/2014 02:29 AM    HGB 14.0 11/20/2014 12:51 AM     Recent Labs      11/21/17   0239   NA  143   K  4.0   CL  110*   BUN  12   CREA  0.85   GLU  97   CA  8.0*       PT/OT:   Gait:  Gait  Base of Support: Narrowed  Speed/Fariba: Slow  Gait Abnormalities: Decreased step clearance  Ambulation - Level of Assistance: Contact guard assistance  Distance (ft): 40 Feet (ft)  Assistive Device: Gait belt, Walker, rolling                 PATIENT MOBILITY  Bed Mobility Training  Supine to Sit: Minimum assistance  Sit to Supine: Minimum assistance  Transfer Training  Sit to Stand: Minimum assistance, Assist x2  Stand to Sit: Contact guard assistance, Assist x1      Gait Training  Assistive Device: Gait belt, Walker, rolling  Ambulation - Level of Assistance: Contact guard assistance  Distance (ft): 40 Feet (ft)   Weight Bearing Status  Right Side Weight Bearing: As tolerated        Assessment and Plan    Principal Problem:    Primary osteoarthritis of right knee (11/20/2017)          POD#1 Procedure(s):  RIGHT UNICONDYLAR KNEE ARTHROPLASTY    Stable postop. Labs trending as expected.   VTE prophylaxis:ASA, Mobilization, Ankle pumping exercises, SCDs  Weight bearing:  WBAT  Pain management:  Multi-modal pain plan, see above for medication,  Ice packs & elevation of extremity per orders, active gentle ROM & mobilize frequently for short periods of time. PT & OT  Wound benign. Neurovascularly intact. Continue present plans per orthopedic attending surgeon & interdisciplinary team for joint replacement. Discharge Planning: Home tomorrow with [facility] services.   Plans per Dr. Ryan Blackburn    Signed By: Henok Reed NP    RN, MSN, MA, Adult NP-BC

## 2017-11-21 NOTE — PROGRESS NOTES
Problem: Discharge Planning  Goal: *Discharge to safe environment  Outcome: Progressing Towards Goal  Home with home health and medical follow up

## 2017-11-21 NOTE — PROGRESS NOTES
Problem: Self Care Deficits Care Plan (Adult)  Goal: *Acute Goals and Plan of Care (Insert Text)  Occupational Therapy Goals initiated 11/21/17  1. Patient will complete standing grooming task with supervision within 4 days. 2. Patient will complete toilet transfer with supervision within 4 days. 3. Patient will complete all aspects of toileting at Mod I level within 4 days. 4. Patient will complete LB dressing at Mod I level within 4 days. Occupational Therapy EVALUATION  Patient: Angélica Rodney (22 y.o. female)  Date: 11/21/2017  Primary Diagnosis: M17.11 Unilateral primary osteoarthritis, right knee  Primary osteoarthritis of right knee  Procedure(s) (LRB):  RIGHT UNICONDYLAR KNEE ARTHROPLASTY   (Right) 1 Day Post-Op   Precautions:  Fall, WBAT    ASSESSMENT :  Based on the objective data described below, the patient presents with decreased activity tolerance, decreased mobility in RLE, and decreased standing balance limiting safety and independence with ADLs and functional mobility tasks. Pt was motivated to participate during session, with pt open to additional education on home safety. Pt completed all activities at SBA-supervision level, with Min A provided to assist pt with bringing RLE over EOB. Pt remained stable and did not c/o dizziness or nausea during session, and no LOB episodes occurred. Pt demonstrated good awareness of need for safety and demonstrated good technique with toilet transfer. Pt reported having equipment in place at home that will allow for increased safety, including grab bars and tub bench. Pt reported having family in area that would be available for assistance, but that she is unsure of the frequency of assistance due to family work schedules. Pt would benefit from additional skilled acute OT services to provide education on safe techniques while completing ADLs and functional mobility tasks and promote increased activity tolerance during hospitalization.  Discharge recommendation for home health pending progress toward therapy goals. Patient will benefit from skilled intervention to address the above impairments. Patients rehabilitation potential is considered to be Good  Factors which may influence rehabilitation potential include:   []                None noted  []                Mental ability/status  []                Medical condition  [x]                Home/family situation and support systems  []                Safety awareness  [x]                Pain tolerance/management  []                Other:      PLAN :  Recommendations and Planned Interventions:  [x]                  Self Care Training                  [x]           Therapeutic Activities  [x]                  Functional Mobility Training    []           Cognitive Retraining  [x]                  Therapeutic Exercises           [x]           Endurance Activities  []                  Balance Training                   []           Neuromuscular Re-Education  []                  Visual/Perceptual Training     [x]      Home Safety Training  [x]                  Patient Education                 [x]           Family Training/Education  []                  Other (comment):    Frequency/Duration: Patient will be followed by occupational therapy 5 times a week to address goals. Discharge Recommendations: Home Health  Further Equipment Recommendations for Discharge: None at this time     SUBJECTIVE:   Patient stated This knee hurts more than the other knee replacement I had.     OBJECTIVE DATA SUMMARY:   HISTORY:   Past Medical History:   Diagnosis Date    Arthritis     Bronchitis     Difficult intubation     NUMEROUS TMJ SURGERIES CAUSING DIFFICULT INTUBATION SOMETIMES    GERD (gastroesophageal reflux disease)     Psychiatric disorder     DEPRESSION     Past Surgical History:   Procedure Laterality Date    HX HEENT  1983-1988 X4    TMJ SURGERY X4    HX KNEE ARTHROSCOPY Right 2002    HX MOHS PROCEDURES Right 2007    RIGHT TEMPORAL SCALP    HX ORTHOPAEDIC Left 11/19/2014    PARTIAL KNEE REPLACEMENT    HX ROTATOR CUFF REPAIR Bilateral 2006    DR CURTIS    HX TUBAL LIGATION  1974       Prior Level of Function/Environment/Context: Independent-Mod I with all ADLs/IADLs. Pt lives   Expanded or extensive additional review of patient history:     Home Situation  Home Environment: Private residence  # Steps to Enter: 2  Rails to Enter: Yes  Hand Rails : Bilateral  Wheelchair Ramp: Yes  One/Two Story Residence: One story  Living Alone: No  Support Systems: Child(apryl), Family member(s)  Patient Expects to be Discharged to[de-identified] Private residence  Current DME Used/Available at Home: Cane, straight, Commode, bedside, Grab bars, Raised toilet seat, Walker  Tub or Shower Type: Shower  [x]  Right hand dominant   []  Left hand dominant    EXAMINATION OF PERFORMANCE DEFICITS:  Cognitive/Behavioral Status:  Neurologic State: Alert  Orientation Level: Oriented X4  Cognition: Appropriate decision making; Appropriate for age attention/concentration; Appropriate safety awareness; Follows commands  Perception: Appears intact  Perseveration: No perseveration noted  Safety/Judgement: Awareness of environment;Good awareness of safety precautions; Home safety; Insight into deficits    Skin: Surgical dressing clean and dry. Visible skin intact. Edema: No significant edema observed during session. Hearing: Auditory  Auditory Impairment: None    Vision/Perceptual:       Acuity: Within Defined Limits         Range of Motion:  AROM: Generally decreased, functional       Strength:  Strength: Generally decreased, functional     Coordination:     Fine Motor Skills-Upper: Left Intact; Right Intact    Gross Motor Skills-Upper: Left Impaired;Right Impaired    Tone & Sensation:     Sensation: Intact      Balance:  Sitting: Intact  Standing: Intact; With support  Standing - Static: Constant support;Good  Standing - Dynamic : Good    Functional Mobility and Transfers for ADLs:  Bed Mobility:  Supine to Sit: Minimum assistance  Sit to Supine: Minimum assistance  Scooting: Supervision    Transfers:  Sit to Stand: Stand-by asssistance  Stand to Sit: Stand-by asssistance  Toilet Transfer : Stand-by asssistance    ADL Assessment:  Feeding: Independent    Oral Facial Hygiene/Grooming: Setup    Bathing: Supervision;Setup    Upper Body Dressing: Setup    Lower Body Dressing: Setup; Additional time    Toileting: Supervision     ADL Intervention and task modifications:   Pt transitioned from supine in bed with HOB slightly elevated to sitting at EOB with Min A to assist with moving RLE. Pt performed sit to stand with SBA in preparation for toilet transfer. Pt completed toilet transfer with SBA to simulate home environment. Pt participated in additional education on safety with bathing and use of RW for shower transfers in preparation for discharge. Pt sat in chair with SBA and RW. Pt left in no apparent distress sitting in chair with call bell within reach and pt's son present in room. Cognitive Retraining  Safety/Judgement: Awareness of environment;Good awareness of safety precautions; Home safety; Insight into deficits    Educated patient on energy conservation techniques, strategies to maximize quality of life and decrease stress/anxiety. 1. Deep breathing  2. Educated on pacing and making sure he/she takes short frequent breaks (e.g. In the shower wash the upper body, rest for 1 minute, then wash the lower body, etc)  3. Educated on using cooler water in the shower so as to not get fatigued from the heat  4. Educated on drying off by using a sudha cloth robe  5. Educated on re-arranging his/her routine to allow for rest breaks in the morning routine  6.  Educated on using a mantra and medication to decrease feelings of anxiety, especially when short of breath  7. Educated on looking at the consequences of his/her actions before deciding he/she needs to take on a task (e.g not getting down on one's hands and knees to wash floors because it will take all of one's energy for the day and result in exhaustion). 8. Educated on DME used to help conserve energy, such as a shower seat, a stool or chair in the kitchen, and pushing or pulling items instead of carrying them. 9. Educated on fall alert necklaces/bracelets to increase safety      Bathing: Patient instructed and indicated understanding when bathing to not submerge wound in water, stand to shower or sponge bathe, cover wound with plastic and tape to ensure no water reaches bandage/wound without cues. Dressing joint: Patient instructed and indicated understanding to don/doff RLE first/last.  Patient instructed to don all clothing while sitting prior to standing, doff all clothing to knees while standing, then sit to doff clothing off from knees to feet in order to facilitate fall prevention, pain management, and energy conservation. Dressing joint reach exercise: To increase independence with lower body dressing, patient instructed to reach down RLE in a seated position slowly to prevent tearing/shearing until slight pull is felt, hold at end range for 10 seconds, then return to starting upright position. Patient instructed to complete three sets of three repetitions each daily. Patient demonstrated understanding. Home safety: Patient instructed and indicated understanding on home modifications and safety (raise height of ADL objects, appropriate height of chair surfaces, recliner safety, change of floor surfaces, clear pathways) to increase independence and fall prevention. Standing: Patient instructed and indicated understanding to walk up to sink/counter top/surfaces, step into walker to increase safety of joint and fall prevention. Patient educated about knee anatomy verbally and with pictures and educated to avoid rotation of R LE.   Instructed to apply concept to ADLs within the home (no twisting of knee during reaching across body, square off while using objects, slide objects along surfaces). Patient instructed and indicated understanding to increase amount of time standing, observe standing position during ADLs in order to increase even weight bearing through bilateral LEs in order to increase independence with ADLs. Goal to be reached 30 days post - op, per orthopedic surgeon or per PT. Tub transfer: Patient instructed and indicated understanding regarding when it is safe to begin transfer into tub (complete stairs with PT, advance exercises with PT high enough to clear tub height). Patient instructed to use the same technique as used with stairs when entering and exiting tub (\"up with the non-surgical, down with the surgical leg\"). Functional Measure:  Barthel Index:    Bathin  Bladder: 10  Bowels: 10  Groomin  Dressin  Feeding: 10  Mobility: 10  Stairs: 5  Toilet Use: 5  Transfer (Bed to Chair and Back): 10  Total: 70       Barthel and G-code impairment scale:  Percentage of impairment CH  0% CI  1-19% CJ  20-39% CK  40-59% CL  60-79% CM  80-99% CN  100%   Barthel Score 0-100 100 99-80 79-60 59-40 20-39 1-19   0   Barthel Score 0-20 20 17-19 13-16 9-12 5-8 1-4 0      The Barthel ADL Index: Guidelines  1. The index should be used as a record of what a patient does, not as a record of what a patient could do. 2. The main aim is to establish degree of independence from any help, physical or verbal, however minor and for whatever reason. 3. The need for supervision renders the patient not independent. 4. A patient's performance should be established using the best available evidence. Asking the patient, friends/relatives and nurses are the usual sources, but direct observation and common sense are also important. However direct testing is not needed. 5. Usually the patient's performance over the preceding 24-48 hours is important, but occasionally longer periods will be relevant.   6. Middle categories imply that the patient supplies over 50 per cent of the effort. 7. Use of aids to be independent is allowed. Ashley Llanos., Barthel, D.W. ([id number]). Functional evaluation: the Barthel Index. [address] [facility] (14)2. Bang HAYDEN Hernandez, Luis Alberto Oh., Kt Pederson., [place], [address] [facility] (1999). Measuring the change indisability after inpatient rehabilitation; comparison of the responsiveness of the Barthel Index and Functional [place] Measure. Journal of Neurology, Neurosurgery, and Psychiatry, 66(4), 112-834. DEON Huynh.A, HEATH Luna, & Momo Jacobs MJenniferA. (2004.) Assessment of post-stroke quality of life in cost-effectiveness studies: The usefulness of the Barthel Index and the EuroQoL-5D. Quality of Life Research, 13, 293-02       G codes: In compliance with CMSs Claims Based Outcome Reporting, the following G-code set was chosen for this patient based on their primary functional limitation being treated: The outcome measure chosen to determine the severity of the functional limitation was the Barthel Index with a score of 70/100 which was correlated with the impairment scale. ? Self Care:     - CURRENT STATUS: CJ - 20%-39% impaired, limited or restricted    - GOAL STATUS: CI - 1%-19% impaired, limited or restricted    - D/C STATUS:  ---------------To be determined---------------     Occupational Therapy Evaluation Charge Determination   History Examination Decision-Making   LOW Complexity : Brief history review  MEDIUM Complexity : 3-5 performance deficits relating to physical, cognitive , or psychosocial skils that result in activity limitations and / or participation restrictions MEDIUM Complexity : Patient may present with comorbidities that affect occupational performnce.  Miniml to moderate modification of tasks or assistance (eg, physical or verbal ) with assesment(s) is necessary to enable patient to complete evaluation       Based on the above components, the patient evaluation is determined to be of the following complexity level: LOW     Pain:  Pain Scale 1: Numeric (0 - 10)  Pain Intensity 1: 7  Pain Location 1: Knee        Pain Intervention(s) 1: Medication (see MAR)  Activity Tolerance:   Pt tolerated all session activities with vital signs stable and no signs of orthostatic hypotension or distress. After treatment:   [x] Patient left in no apparent distress sitting up in chair  [] Patient left in no apparent distress in bed  [x] Call bell left within reach  [] Nursing notified  [x] Caregiver present  [] Bed alarm activated    COMMUNICATION/EDUCATION:   The patients plan of care was discussed with: Physical Therapist and Registered Nurse. [x]    Home safety education was provided and the patient/caregiver indicated understanding. [x]    Patient/family have participated as able in goal setting and plan of care. [x]    Patient/family agree to work toward stated goals and plan of care. []    Patient understands intent and goals of therapy, but is neutral about his/her participation. []    Patient is unable to participate in goal setting and plan of care. This patients plan of care is appropriate for delegation to [facility]. Thank you for this referral.  Benny Estrada , LATA    Regarding student involvement in patient care:  A student participated in this treatment session. Per CMS Medicare statements and AOTA guidelines I certify that the following was true:  1. I was present and directly observed the entire session. 2. I made all skilled judgments and clinical decisions regarding care. 3. I am the practitioner responsible for assessment, treatment, and documentation.     Sera Gordon OT

## 2017-11-21 NOTE — PROGRESS NOTES
Bedside and Verbal shift change report given to Aliza (oncoming nurse) by Ele Barry RN (offgoing nurse). Report given with Nick BONNER and MAR.

## 2017-11-21 NOTE — PROGRESS NOTES
Problem: Mobility Impaired (Adult and Pediatric)  Goal: *Acute Goals and Plan of Care (Insert Text)  Physical Therapy Goals  Initiated 11/20/2017    1. Patient will move from supine to sit and sit to supine , scoot up and down and roll side to side in bed with independence within 4 days. 2. Patient will perform sit to stand with modified independence within 4 days. 3. Patient will ambulate with modified independence for 300 feet with the least restrictive device within 4 days. 4. Patient will ascend/descend 2 stairs with 2 handrail(s) with modified independence within 4 days. 5. Patient will perform home exercise program per protocol with independence within 4 days. 6. Patient will demonstrate AROM 0-90 degrees in operative joint within 4 days. physical Therapy TREATMENT  Patient: Henny Franco (17 y.o. female)  Date: 11/21/2017  Diagnosis: M17.11 Unilateral primary osteoarthritis, right knee  Primary osteoarthritis of right knee Primary osteoarthritis of right knee  Procedure(s) (LRB):  RIGHT UNICONDYLAR KNEE ARTHROPLASTY   (Right) 1 Day Post-Op  Precautions: Fall, WBAT    ASSESSMENT:  Patient continues to progress toward goals. Completed supine and seated exercises prior to mobilization as described below. Overall CGA/min A for bed mobility and transfers with RW. Improved ambulation to 125 feet with RW and CGA, slight step through pattern although antalgic. Cues for heel strike and TKE in stance. Patient remained OOB in chair at end of session with gel ice packs donned. Patient plans to discharge home tomorrow, she has no stairs to navigate. Recommend HHPT. Progression toward goals:  [x]      Improving appropriately and progressing toward goals  []      Improving slowly and progressing toward goals  []      Not making progress toward goals and plan of care will be adjusted     PLAN:  Patient continues to benefit from skilled intervention to address the above impairments.   Continue treatment per established plan of care. Discharge Recommendations:  Home Health  Further Equipment Recommendations for Discharge: Owns RW     SUBJECTIVE:   Patient stated I'm going to go home tomorrow.     OBJECTIVE DATA SUMMARY:   Critical Behavior:  Neurologic State: Alert  Orientation Level: Oriented X4        Range of Motion:                          Functional Mobility Training:  Bed Mobility:     Supine to Sit: Minimum assistance; Additional time              Transfers:  Sit to Stand: Contact guard assistance; Additional time  Stand to Sit: Contact guard assistance; Additional time  Stand Pivot Transfers: Contact guard assistance                          Balance:  Sitting: Intact  Standing: Impaired  Standing - Static: Good;Constant support  Standing - Dynamic : Good  Ambulation/Gait Training:  Distance (ft): 125 Feet (ft)  Assistive Device: Gait belt;Walker, rolling  Ambulation - Level of Assistance: Contact guard assistance        Gait Abnormalities: Antalgic;Decreased step clearance  Right Side Weight Bearing: As tolerated        Stance: Right decreased  Speed/Fariba: Pace decreased (<100 feet/min)  Step Length: Right shortened;Left shortened                    Stairs:            Therapeutic Exercises:     EXERCISE   Sets   Reps   Active Active Assist   Passive Self ROM   Comments   Ankle Pumps 1 10 [x]                                        []                                        []                                        []                                           Quad Sets 1 10 [x]                                        []                                        []                                        []                                           Hamstring Sets 1 10 [x]                                        []                                        []                                        []                                           Short Arc Quads   []                                        [] []                                        []                                           Knee Extension Stretch     []                                          []                                          []                                          []                                           Heel Slides 2 10 [x]                                        [x]                                        []                                        []                                           Long Arc Quads 1 10 []                                        [x]                                        []                                        []                                           Knee Flexion Stretch   []                                        []                                        []                                        []                                           Straight Leg Raises 1 10 []                                        [x]                                        []                                        []                                             Pain:                    Activity Tolerance:   Good  Please refer to the flowsheet for vital signs taken during this treatment.   After treatment:   [x] Patient left in no apparent distress sitting up in chair  [] Patient left in no apparent distress in bed  [x] Call bell left within reach  [x] Nursing notified  [] Caregiver present  [] Bed alarm activated    COMMUNICATION/COLLABORATION:   The patients plan of care was discussed with: Registered Nurse    Jessica Alston, PT   Time Calculation: 25 mins

## 2017-11-21 NOTE — PROGRESS NOTES
Bedside shift change report given to Dc Alexandre RN (oncoming nurse) by Sandi Dickens RN (offgoing nurse). Report given with SBAR, Kardex, Intake/Output and MAR.

## 2017-11-22 VITALS
WEIGHT: 180.38 LBS | RESPIRATION RATE: 14 BRPM | BODY MASS INDEX: 28.99 KG/M2 | DIASTOLIC BLOOD PRESSURE: 69 MMHG | TEMPERATURE: 97.4 F | HEART RATE: 78 BPM | SYSTOLIC BLOOD PRESSURE: 125 MMHG | OXYGEN SATURATION: 98 % | HEIGHT: 66 IN

## 2017-11-22 LAB — HGB BLD-MCNC: 13 G/DL (ref 11.5–16)

## 2017-11-22 PROCEDURE — 97535 SELF CARE MNGMENT TRAINING: CPT

## 2017-11-22 PROCEDURE — 74011250637 HC RX REV CODE- 250/637: Performed by: ORTHOPAEDIC SURGERY

## 2017-11-22 PROCEDURE — 97110 THERAPEUTIC EXERCISES: CPT

## 2017-11-22 PROCEDURE — 36415 COLL VENOUS BLD VENIPUNCTURE: CPT | Performed by: ORTHOPAEDIC SURGERY

## 2017-11-22 PROCEDURE — 74011250637 HC RX REV CODE- 250/637: Performed by: NURSE PRACTITIONER

## 2017-11-22 PROCEDURE — 85018 HEMOGLOBIN: CPT | Performed by: ORTHOPAEDIC SURGERY

## 2017-11-22 PROCEDURE — 97116 GAIT TRAINING THERAPY: CPT

## 2017-11-22 RX ORDER — ASPIRIN 325 MG
325 TABLET, DELAYED RELEASE (ENTERIC COATED) ORAL EVERY 12 HOURS
Qty: 60 TAB | Refills: 0 | Status: SHIPPED | OUTPATIENT
Start: 2017-11-22

## 2017-11-22 RX ORDER — HYDROCODONE BITARTRATE AND ACETAMINOPHEN 5; 325 MG/1; MG/1
.5-1 TABLET ORAL
Qty: 60 TAB | Refills: 0 | Status: SHIPPED | OUTPATIENT
Start: 2017-11-22

## 2017-11-22 RX ORDER — ACETAMINOPHEN 325 MG/1
325 TABLET ORAL
Status: DISCONTINUED | OUTPATIENT
Start: 2017-11-22 | End: 2017-11-22 | Stop reason: HOSPADM

## 2017-11-22 RX ADMIN — POLYETHYLENE GLYCOL 3350 17 G: 17 POWDER, FOR SOLUTION ORAL at 08:32

## 2017-11-22 RX ADMIN — ACETAMINOPHEN 325 MG: 325 TABLET, FILM COATED ORAL at 08:33

## 2017-11-22 RX ADMIN — HYDROCODONE BITARTRATE AND ACETAMINOPHEN 1 TABLET: 5; 325 TABLET ORAL at 02:12

## 2017-11-22 RX ADMIN — DOCUSATE SODIUM AND SENNOSIDES 1 TABLET: 8.6; 5 TABLET, FILM COATED ORAL at 08:33

## 2017-11-22 RX ADMIN — ASPIRIN 325 MG: 325 TABLET, DELAYED RELEASE ORAL at 08:33

## 2017-11-22 RX ADMIN — HYDROCODONE BITARTRATE AND ACETAMINOPHEN 1 TABLET: 5; 325 TABLET ORAL at 07:16

## 2017-11-22 RX ADMIN — HYDROCODONE BITARTRATE AND ACETAMINOPHEN 1 TABLET: 5; 325 TABLET ORAL at 11:16

## 2017-11-22 NOTE — PROGRESS NOTES
Problem: Self Care Deficits Care Plan (Adult)  Goal: *Acute Goals and Plan of Care (Insert Text)  Occupational Therapy Goals initiated 11/21/17  1. Patient will complete standing grooming task with supervision within 4 days. 2. Patient will complete toilet transfer with supervision within 4 days. 3. Patient will complete all aspects of toileting at Mod I level within 4 days. 4. Patient will complete LB dressing at Mod I level within 4 days. Occupational Therapy TREATMENT  Patient: Yi Wynn (43 y.o. female)  Date: 11/22/2017  Diagnosis: M17.11 Unilateral primary osteoarthritis, right knee  Primary osteoarthritis of right knee Primary osteoarthritis of right knee  Procedure(s) (LRB):  RIGHT UNICONDYLAR KNEE ARTHROPLASTY   (Right) 2 Days Post-Op  Precautions: Fall, WBAT  Chart, occupational therapy assessment, plan of care, and goals were reviewed. ASSESSMENT:  Based on the objective data below, the patient participated in instruction in and practiced use of reacher for LE self care, bed mobility, and instruction in use of gait belt as a leg  with Mod I after education on use of gait belt as a leg . Patient reports being comfortable with managing clothing at this time and is familiar with AE and DME after caring for her parents and . Recommended a walker bag for safety transporting items. Recommend [facility] none. Progression toward goals:  [x]          Improving appropriately and progressing toward goals  []          Improving slowly and progressing toward goals  []          Not making progress toward goals and plan of care will be adjusted     PLAN:  Patient continues to benefit from skilled intervention to address the above impairments. Continue treatment per established plan of care. Discharge Recommendations:  Home Health versus none  Further Equipment Recommendations for Discharge:  none     SUBJECTIVE:   Patient stated Asael Qureshi took care of my parents and my .     OBJECTIVE DATA SUMMARY:   Cognitive/Behavioral Status:  Neurologic State: Alert  Orientation Level: Oriented X4  Cognition: Appropriate decision making; Appropriate for age attention/concentration; Appropriate safety awareness; Follows commands  Perception: Appears intact  Perseveration: No perseveration noted  Safety/Judgement: Awareness of environment  Functional Mobility and Transfers for ADLs:   Bed Mobility:        Sit to Supine: Minimum assistance (for right LE; instructed in and issued gait belt to use as a leg )        Transfers:  Sit to Stand: Modified independent       Balance:  Sitting: Intact  Standing: Intact; With support (walker)  Standing - Static: Good  Standing - Dynamic : Good  ADL Intervention:          Lower Body Dressing Assistance  Pants With Elastic Waist: Modified independent (in simulation aftr education)  Leg Crossed Method Used: No  Position Performed: Bending forward method;Seated in chair  Adaptive Equipment Used: Reacher (reports she has one; instructed for use of donning clothing and doffing socks and retreving items from floor)         Cognitive Retraining  Safety/Judgement: Awareness of environment      Activity Tolerance:    Fair  Please refer to the flowsheet for vital signs taken during this treatment.   After treatment:   []  Patient left in no apparent distress sitting up in chair  [x]  Patient left in no apparent distress in bed  [x]  Call bell left within reach  [x]  Nursing notified  []  Caregiver present  []  Bed alarm activated    COMMUNICATION/COLLABORATION:   The patients plan of care was discussed with: Registered Nurse    MEAGAN Brennan  Time Calculation: 15 mins

## 2017-11-22 NOTE — DISCHARGE INSTRUCTIONS
Patient meets criteria for   BUNDLED PAYMENT   for Care Improvement Initiative Criteria    Contact Information for Orthopedic Nurse Navigator:      MIRELA Robison, RN-BC  B:[phone]  I:[phone]  H:[phone]    After Hospital Care Plan:  Discharge Instructions Unicondylar Knee Replacement  Dr. Leslye Elam    Patient Name: Mango Mireles  Date of procedure: 11/20/2017   Procedure: Procedure(s):  RIGHT UNICONDYLAR KNEE ARTHROPLASTY    Surgeon: Lyn Norris) and Role:     * Chrissy Olivo MD - Primary  PCP: Radha Decker MD  Date of discharge: No discharge date for patient encounter. Follow up appointments   Follow up with Dr. Leslye Elam in 3 weeks. Call [phone] to make an appointment.  If home health has been arranged for you the agency will contact you to arrange dates/times for visits. Please call them if you do not hear from them within 24 hours after you are discharged    When to call your Orthopaedic Surgeon: Call [phone]. If you call after 5pm or on a weekend, the on call physician will be contacted   Unrelieved pain   Signs of infection-if your incision is red; continues to have drainage; drainage has a foul odor or if you have a persistent fever over 101 degrees   Signs of a blood clot in your leg-calf pain, tenderness, redness, swelling of lower leg    When to call your Primary Care Physician:   Concerns about medical conditions such as diabetes, high blood pressure, asthma, congestive heart failure   Call if blood sugars are elevated, persistent headache or dizziness, coughing or congestion, constipation or diarrhea, burning with urination, abnormal heart rate    When to call [phone] go to the nearest emergency room   Acute onset of chest pain, shortness of breath, difficulty breathing      Activity   Weight bearing as tolerated with walker or crutches.  Refer to pages 23-31 of your handbook for instructions and pictures   Complete your Home Exercise Program daily as instructed by your therapist.  Refer to pages 33-41 of your handbook for instructions and pictures   Get up every one hour and walk (except at night when sleeping)   Do not drive or operate heavy machinery    Incision Care   The Aquacel (brown, waterproof) surgical dressing is to remain on your knee for 7 days. On the 7th day have someone gently peel the dressing off by carefully lifting the edge and stretching it slightly to break the adhesive seal and leave incision open to air. You may take a shower with the Aquacel dressing in place.  You have staples in your knee incision; they will be removed by the [address] [place] [address] [facility] staff in 10-14 days and steri-strips will be applied. Leave the steri-strips on until they fall off    Preventing blood clots    Take Enteric Coated  Aspirin 325 mg twice a day (with food) for one month following surgery. Pain management   Take pain medication as prescribed with food & full glass of fluid; decrease the amount you use as your pain lessens   Avoid alcoholic beverages while taking pain medication   Do not take any over-the-counter medication for pain except Tylenol (acetaminophen)   Please be aware that many medications contain Tylenol. We do not want you to over medicate so please read the information below as a guide. Do not take more than 2.5 Grams of Tylenol in a 24 hour period. (There are 1000 milligrams in one Gram)  o 325 mg of Tylenol per tablet (do not take more than 7 tablets in 24 hours)  o 500 mg of Tylenol per tablet (do not take more than 5 tablets in 24 hours)   Elevate your leg (do not bend/flex knee) and place ice bags on your knee for 15-20 minutes after exercising and as needed throughout the day and night    Diet   Resume usual diet; drink plenty of fluids; eat foods high in fiber   You may want to take a stool softener (such as Senokot-S or Colace) to prevent constipation while you are taking pain medication.   If constipation occurs, take a laxative (such as Dulcolax tablets, Milk of Magnesia, or a suppository)    [facility] Protocol (to be followed by [address])    Nursing-per physicians order   Remove staples 10-14 days post op and apply steri-strips   Remove Aquacel dressing at 7 days post-op    Complete head to toe assessment, vital signs   Medication reconciliation   Review pain management   Manage chronic medical conditions    Physical Therapy-per physicians order    Weight bearing status:  Precautions at Admission: Fall, WBAT     Right Side Weight Bearing: As tolerated    Mobility Status:  Supine to Sit: Minimum assistance  Sit to Stand: Stand-by asssistance  Sit to Supine: Minimum assistance       Gait:  Distance (ft): 150 Feet (ft)  Ambulation - Level of Assistance: Stand-by asssistance  Assistive Device: Gait belt, Walker, rolling  Gait Abnormalities: Antalgic, Decreased step clearance    ADL status overall composite: Toilet Transfer : Stand-by asssistance       Physical Therapy   Assessment and evaluation-bed mobility; functional transfers (bed, chair, bathroom, stairs); ambulation with equipment, car transfers, shower transfers, safety and ability to get out of house in the event of an emergency   Review and maintain weight bearing as tolerated   Discuss pain management   Review how to do ADLs.  Refer to page 42 of patient handbook    Home Exercise Program-refer to pages 33-41 of the patient handbook for exercises

## 2017-11-22 NOTE — PROGRESS NOTES
Patient is moaning and groaning, C/O pain 10/10 to her rt knee. She receive tramadol 50 mg at 1930 with no effect on her pain. Dr orozco notified and he gave an order for hydrocodone.

## 2017-11-22 NOTE — PROGRESS NOTES
Problem: Mobility Impaired (Adult and Pediatric)  Goal: *Acute Goals and Plan of Care (Insert Text)  Physical Therapy Goals  Initiated 11/20/2017    1. Patient will move from supine to sit and sit to supine , scoot up and down and roll side to side in bed with independence within 4 days. 2. Patient will perform sit to stand with modified independence within 4 days. 3. Patient will ambulate with modified independence for 300 feet with the least restrictive device within 4 days. 4. Patient will ascend/descend 2 stairs with 2 handrail(s) with modified independence within 4 days. 5. Patient will perform home exercise program per protocol with independence within 4 days. 6. Patient will demonstrate AROM 0-90 degrees in operative joint within 4 days. physical Therapy TREATMENT  Patient: Bri Vizcarra (89 y.o. female)  Date: 11/22/2017  Diagnosis: M17.11 Unilateral primary osteoarthritis, right knee  Primary osteoarthritis of right knee Primary osteoarthritis of right knee  Procedure(s) (LRB):  RIGHT UNICONDYLAR KNEE ARTHROPLASTY   (Right) 2 Days Post-Op  Precautions: Fall, WBAT    ASSESSMENT:  Patient c/o significant pain but reported improvement with activity. Good progression of gait today and deferred stairs d/t none at home. She was quite guarded and reluctant to work on knee flexion b ut her tolerance improved with heel slides and flexion at bedside. She has met her acute goals and is clear for discharge home with family and HHPT. Progression toward goals:  [x]      Improving appropriately and progressing toward goals  []      Improving slowly and progressing toward goals  []      Not making progress toward goals and plan of care will be adjusted     PLAN:  Patient continues to benefit from skilled intervention to address the above impairments. Continue treatment per established plan of care.   Discharge Recommendations:  Home Health  Further Equipment Recommendations for Discharge:  None     SUBJECTIVE: Patient stated It really hurts when I bring it off the side of the bed.     OBJECTIVE DATA SUMMARY:   Critical Behavior:  Neurologic State: Alert  Orientation Level: Oriented X4  Cognition: Appropriate decision making, Appropriate for age attention/concentration, Appropriate safety awareness, Follows commands  Safety/Judgement: Awareness of environment  Range of Motion:        RLE Assessment (WDL): Exceptions to WDL  RLE AROM  R Knee Flexion: 87  R Knee Extension: 8 (lacking )              Functional Mobility Training:  Bed Mobility:     Supine to Sit: Supervision  Sit to Supine: Contact guard assistance  Scooting: Supervision        Transfers:  Sit to Stand: Modified independent  Stand to Sit: Modified independent        Bed to Chair: Modified independent                    Balance:  Sitting: Intact  Standing: Intact; With support  Standing - Static: Good  Standing - Dynamic : Good  Ambulation/Gait Training:  Distance (ft): 175 Feet (ft)  Assistive Device: Gait belt;Walker, rolling  Ambulation - Level of Assistance: Stand-by asssistance        Gait Abnormalities: Antalgic;Decreased step clearance  Right Side Weight Bearing: As tolerated     Base of Support: Narrowed  Stance: Right decreased  Speed/Fariba: Pace decreased (<100 feet/min)  Step Length: Left shortened                  Cued for scapular depression and erect posture  Stairs:            Therapeutic Exercises:     EXERCISE   Sets   Reps   Active Active Assist   Passive Self ROM   Comments   Ankle Pumps 1  [x]                                        []                                        []                                        []                                           Quad Sets   []                                        []                                        []                                        []                                           Hamstring Sets 1  [x]                                        [] []                                        []                                           Short Arc Quads 1  []                                        [x]                                        []                                        []                                           Knee Extension Stretch 1 120 sec   []                                          []                                          [x]                                          []                                           Heel Slides 1 10 [x]                                        []                                        []                                        []                                           Long Arc Quads   []                                        []                                        []                                        []                                           Knee Flexion Stretch 1 10 [x]                                        []                                        []                                        []                                           Straight Leg Raises   []                                        []                                        []                                        []                                             Pain:  Pain Scale 1: Numeric (0 - 10)  Pain Intensity 1: 7  Pain Location 1: Knee  Pain Orientation 1: Right  Pain Description 1: Aching  Pain Intervention(s) 1: Medication (see MAR)    After treatment:   [x] Patient left in no apparent distress sitting up in chair  [] Patient left in no apparent distress in bed  [x] Call bell left within reach  [x] Nursing notified  [] Caregiver present  [] Bed alarm activated    COMMUNICATION/COLLABORATION:   The patients plan of care was discussed with: Registered Nurse    Mary Gavin, PT, DPT   Time Calculation: 24 mins

## 2017-11-22 NOTE — PROGRESS NOTES
I have reviewed discharge instructions with the patient and spouse. The patient verbalized understanding. I have reviewed discharge instructions with the patient and spouse. The patient and spouse verbalized understanding. Patient being discharge with a copy of the discharge instructions, 2 prescriptions,and personal belongings. He was wheeled down to pt discharge by volunteers and will be driven home by her daughter.

## 2017-11-22 NOTE — PROGRESS NOTES
Bedside and Verbal shift change report given to Cory Stevens RN (oncoming nurse) by Lidia Edwards RN (offgoing nurse). Report included the following information SBAR.

## 2017-11-22 NOTE — PROGRESS NOTES
Complaints: none   Events: none    GEN:  NAD. AOx3   ABD:  S/NT/ND   RLE:  Dressing C/D/I    5/5 motor    Calf nttp (Bilat)    Sensate all distribution to light touch    1+ dp/pt pulses, foot perfused      Lab Results   Component Value Date/Time    HGB 13.0 11/22/2017 02:14 AM    INR 1.0 10/30/2017 12:29 PM       Lab Results   Component Value Date/Time    Sodium 143 11/21/2017 02:39 AM    Potassium 4.0 11/21/2017 02:39 AM    Chloride 110 11/21/2017 02:39 AM    CO2 25 11/21/2017 02:39 AM    BUN 12 11/21/2017 02:39 AM    Creatinine 0.85 11/21/2017 02:39 AM    Calcium 8.0 11/21/2017 02:39 AM            POD#2 R UNI. Doing Well    ABX: Complete today  PATHWAY: D/C Eren per protocol  DVT Prophylaxis:asa   Weight Bearing: WBAT RLE   Pain Control: PRN oral narcotics, celebrex  Anticipated Discharge Date: TODAY   Disposition: Home, PT.

## 2017-11-22 NOTE — PROGRESS NOTES
Bedside and Verbal shift change report given to ,Roc Ham (oncoming nurse) by Ramsey Mccauley RN (offgoing nurse). Report given with Nick BONNER and MAR.

## 2017-11-27 NOTE — DISCHARGE SUMMARY
@7JUNP@ [address] [place] [address]   [address] [address]    DISCHARGE SUMMARY     Patient: Raegan Mendosa                             Medical Record Number: [id number]                : 1940  Age: 69 y.o. Admit Date: 2017  Discharge Date: 2017  Admission Diagnosis: M17.11 Unilateral primary osteoarthritis, right knee  Primary osteoarthritis of right knee  Discharge Diagnosis: M17.11 Unilateral primary osteoarthritis, right knee  Procedures: Procedure(s):  RIGHT UNICONDYLAR KNEE ARTHROPLASTY    Surgeon: Qi Jackman  Assistants: None  Anesthesia: spinal  Complications: None     History of Present Illness:  Raegan Mendosa is a 69 y.o. female with a history of Right knee pain, swelling, and marked loss of function. Despite conservative management and after clinical and radiographic evaluation, it was determined that she suffered from end-stage osteoarthritis and would benefit from Procedure(s):  RIGHT UNICONDYLAR KNEE ARTHROPLASTY  , which she consented to undergo after a discussion of the risks, benefits, alternatives, rehab concerns, and potential complications of surgery. Hospital Course:  Raegan Mendosa tolerated the procedure well. She was transferred  to the recovery room in stable condition. After a brief stay the patient was then transferred to the Joint Replacement Unit at [address] [place] [address].  On postoperative day #1, the dressing was clean and dry, she was neurovascularly intact. The patient was afebrile and vital signs were stable. Calves were soft and non-tender bilaterally. On postoperative day  # 2, the patient was tolerating a regular diet and making satisfactory progress with physical therapy.   Hemoglobin and INR prior to discharge were   Lab Results   Component Value Date/Time    HGB 13.0 2017 02:14 AM    INR 1.0 10/30/2017 12:29 PM   .  Raegan Mendosa made satisfactory progress with physical therapy and was discharged to Home in stable condition on postoperative day 2. She was provided with routine postoperative instructions and advised to follow up in my office in 3 weeks following discharge from the hospital.  She was prescribed ASA for DVT prophylaxis and oxycodone for post-operative pain. Discharge Medications:  Cannot display discharge medications since this patient is not currently admitted.       Signed by: Rohini Pruitt MD  11/27/2017

## 2017-11-27 NOTE — NURSE NAVIGATOR
[facility]  SBAR Bundled Payment Handoff     FROM:                                TO: At [facility]                                                      ([facility] or Facility name)  [address]  [phone] [id number]  Dept: [address]: [phone]                                      Room#:  576/01                                                       Nurse Navigator:  Miranda Dominique RN           SITUATION      ASAScore: ASA 2 - Patient with mild systemic disease with no functional limitations    Admitted:  11/20/2017  Hospital Day: 3      Attending Provider:  No att. providers found     Consultations:  None    PCP:  Lawsno Calderon MD   [phone]     Admitting Dx:  M17.11 Unilateral primary osteoarthritis, right knee  Primary osteoarthritis of right knee       Principal Problem:    Primary osteoarthritis of right knee (11/20/2017)      7 Days Post-Op of   Procedure(s):  RIGHT UNICONDYLAR KNEE ARTHROPLASTY     BY: Jason Salazar MD             ON: 11/20/2017                  Code Status: Prior             Advance Directive? Yes Not W Pt (Send w/patient)     Isolation:  There are currently no Active Isolations       MDRO: No current active infections    BACKGROUND     Allergies:   Allergies   Allergen Reactions    Augmentin [Amoxicillin-Pot Clavulanate] Itching    Codeine Palpitations       Past Medical History:   Diagnosis Date    Arthritis     Bronchitis     Difficult intubation     NUMEROUS TMJ SURGERIES CAUSING DIFFICULT INTUBATION SOMETIMES    GERD (gastroesophageal reflux disease)     Psychiatric disorder     DEPRESSION       Past Surgical History:   Procedure Laterality Date    HX HEENT  1983-1988 X4    TMJ SURGERY X4    HX KNEE ARTHROSCOPY Right 2002    HX MOHS PROCEDURES Right 2007    RIGHT TEMPORAL SCALP    HX ORTHOPAEDIC Left 11/19/2014    PARTIAL KNEE REPLACEMENT    HX ROTATOR CUFF REPAIR Bilateral 2006    DR CURTIS    HX TUBAL LIGATION  1974       Prior to Admission Medications   Prescriptions Last Dose Informant Patient Reported? Taking? Calcium-Cholecalciferol, D3, (CALCIUM 600 WITH VITAMIN D3) 600 mg(1,500mg) -400 unit cap 11/13/2017 at Unknown time  Yes Yes   Sig: Take  by mouth every Monday, Wednesday, Friday. VIT B COMPLEX 100 CMB #2/HERBS (VITAMIN B COMPLEX 100 #2-HERBS PO) 11/13/2017 at Unknown time  Yes Yes   Sig: Take  by mouth every Monday, Wednesday, Friday. ascorbic acid (VITAMIN C) 500 mg tablet 11/13/2017 at Unknown time  Yes Yes   Sig: Take  by mouth every Monday, Wednesday, Friday. calcium carbonate (TUMS) 200 mg calcium (500 mg) chew 11/11/2017 at Unknown time  Yes Yes   Sig: Take 1 Tab by mouth as needed. Facility-Administered Medications: None       Vaccinations: There is no immunization history on file for this patient. ASSESSMENT   Age: 69 y.o. Gender: female        Height: Height: 5' 5.5\" (166.4 cm)                    Weight:Weight: 81.8 kg (180 lb 6 oz)     No data found. Active Orders   There are no active orders of the following type(s): Diet. Orientation: Orientation Level: Oriented X4    Active Lines/Drains:  (Peg Tube / Jason / CL or S/L?):no    Urinary Status: Voiding      Last BM: Last Bowel Movement Date: 11/18/17     Skin Integrity: Incision (comment)   Wound Knee Right-DRESSING STATUS: Clean, dry, and intact    Wound Knee Right-DRESSING TYPE: Cast padding    Mobility: Slightly limited   Weight Bearing Status: WBAT (Weight Bearing as Tolerated)      Gait Training  Assistive Device: Gait belt, Walker, rolling  Ambulation - Level of Assistance: Stand-by asssistance  Distance (ft): 175 Feet (ft)     On Anticoagulation?  YES  Aspirin                                          Pain Medications given:  oxycodone                                   Lab Results   Component Value Date/Time    Glucose 97 11/21/2017 02:39 AM    Hemoglobin A1c 5.6 10/30/2017 12:29 PM    INR 1.0 10/30/2017 12:29 PM    INR 1.0 11/21/2014 02:29 AM    HGB 13.0 11/22/2017 02:14 AM    HGB 12.4 11/21/2017 02:39 AM    HGB 13.4 10/30/2017 12:29 PM    HGB 12.4 11/21/2014 02:29 AM       Readmission Risks:  Score:         RECOMMENDATION     See After Visit Summary (AVS) for:  · Discharge instructions  · After [address]   · Medication Reconciliation          [facility] Orthopaedic Nurse Navigator  MIRELA Chavez, RN-BC       Office  [phone]  Cell      [phone]  Fax      [phone]  [email or web address]             . Dana

## 2022-03-19 PROBLEM — M17.11 PRIMARY OSTEOARTHRITIS OF RIGHT KNEE: Status: ACTIVE | Noted: 2017-11-20

## 2022-04-02 ENCOUNTER — HOSPITAL ENCOUNTER (EMERGENCY)
Age: 82
Discharge: HOME | End: 2022-04-02
Attending: EMERGENCY MEDICINE | Admitting: EMERGENCY MEDICINE
Payer: MEDICARE

## 2022-04-02 ENCOUNTER — APPOINTMENT (OUTPATIENT)
Dept: GENERAL RADIOLOGY | Age: 82
End: 2022-04-02
Attending: FAMILY MEDICINE
Payer: MEDICARE

## 2022-04-02 VITALS
RESPIRATION RATE: 16 BRPM | SYSTOLIC BLOOD PRESSURE: 189 MMHG | HEART RATE: 75 BPM | OXYGEN SATURATION: 99 % | TEMPERATURE: 96.9 F | DIASTOLIC BLOOD PRESSURE: 83 MMHG

## 2022-04-02 DIAGNOSIS — R07.9 CHEST PAIN, UNSPECIFIED TYPE: Primary | ICD-10-CM

## 2022-04-02 DIAGNOSIS — K21.9 GASTROESOPHAGEAL REFLUX DISEASE, UNSPECIFIED WHETHER ESOPHAGITIS PRESENT: ICD-10-CM

## 2022-04-02 LAB
ALBUMIN SERPL-MCNC: 3.5 G/DL (ref 3.5–5)
ALBUMIN/GLOB SERPL: 1.1 {RATIO} (ref 1.1–2.2)
ALP SERPL-CCNC: 88 U/L (ref 45–117)
ALT SERPL-CCNC: 25 U/L (ref 12–78)
ANION GAP SERPL CALC-SCNC: 5 MMOL/L (ref 5–15)
AST SERPL-CCNC: 17 U/L (ref 15–37)
BASOPHILS # BLD: 0.1 K/UL (ref 0–0.1)
BASOPHILS NFR BLD: 1 % (ref 0–1)
BILIRUB SERPL-MCNC: 0.7 MG/DL (ref 0.2–1)
BUN SERPL-MCNC: 14 MG/DL (ref 6–20)
BUN/CREAT SERPL: 17 (ref 12–20)
CALCIUM SERPL-MCNC: 9 MG/DL (ref 8.5–10.1)
CHLORIDE SERPL-SCNC: 108 MMOL/L (ref 97–108)
CO2 SERPL-SCNC: 27 MMOL/L (ref 21–32)
COMMENT, HOLDF: NORMAL
CREAT SERPL-MCNC: 0.82 MG/DL (ref 0.55–1.02)
DIFFERENTIAL METHOD BLD: NORMAL
EOSINOPHIL # BLD: 0.2 K/UL (ref 0–0.4)
EOSINOPHIL NFR BLD: 3 % (ref 0–7)
ERYTHROCYTE [DISTWIDTH] IN BLOOD BY AUTOMATED COUNT: 12.8 % (ref 11.5–14.5)
GLOBULIN SER CALC-MCNC: 3.3 G/DL (ref 2–4)
GLUCOSE SERPL-MCNC: 96 MG/DL (ref 65–100)
HCT VFR BLD AUTO: 44 % (ref 35–47)
HGB BLD-MCNC: 14.2 G/DL (ref 11.5–16)
IMM GRANULOCYTES # BLD AUTO: 0 K/UL (ref 0–0.04)
IMM GRANULOCYTES NFR BLD AUTO: 0 % (ref 0–0.5)
LIPASE SERPL-CCNC: 156 U/L (ref 73–393)
LYMPHOCYTES # BLD: 2.3 K/UL (ref 0.8–3.5)
LYMPHOCYTES NFR BLD: 38 % (ref 12–49)
MCH RBC QN AUTO: 29.6 PG (ref 26–34)
MCHC RBC AUTO-ENTMCNC: 32.3 G/DL (ref 30–36.5)
MCV RBC AUTO: 91.9 FL (ref 80–99)
MONOCYTES # BLD: 0.6 K/UL (ref 0–1)
MONOCYTES NFR BLD: 11 % (ref 5–13)
NEUTS SEG # BLD: 2.8 K/UL (ref 1.8–8)
NEUTS SEG NFR BLD: 47 % (ref 32–75)
NRBC # BLD: 0 K/UL (ref 0–0.01)
NRBC BLD-RTO: 0 PER 100 WBC
PLATELET # BLD AUTO: 251 K/UL (ref 150–400)
PMV BLD AUTO: 9.3 FL (ref 8.9–12.9)
POTASSIUM SERPL-SCNC: 3.7 MMOL/L (ref 3.5–5.1)
PROT SERPL-MCNC: 6.8 G/DL (ref 6.4–8.2)
RBC # BLD AUTO: 4.79 M/UL (ref 3.8–5.2)
SAMPLES BEING HELD,HOLD: NORMAL
SODIUM SERPL-SCNC: 140 MMOL/L (ref 136–145)
TROPONIN-HIGH SENSITIVITY: 6 NG/L (ref 0–51)
WBC # BLD AUTO: 6 K/UL (ref 3.6–11)

## 2022-04-02 PROCEDURE — 84484 ASSAY OF TROPONIN QUANT: CPT

## 2022-04-02 PROCEDURE — 71046 X-RAY EXAM CHEST 2 VIEWS: CPT

## 2022-04-02 PROCEDURE — 93005 ELECTROCARDIOGRAM TRACING: CPT

## 2022-04-02 PROCEDURE — 99285 EMERGENCY DEPT VISIT HI MDM: CPT

## 2022-04-02 PROCEDURE — 80053 COMPREHEN METABOLIC PANEL: CPT

## 2022-04-02 PROCEDURE — 83690 ASSAY OF LIPASE: CPT

## 2022-04-02 PROCEDURE — 85025 COMPLETE CBC W/AUTO DIFF WBC: CPT

## 2022-04-02 PROCEDURE — 36415 COLL VENOUS BLD VENIPUNCTURE: CPT

## 2022-04-02 RX ORDER — HYDROGEN PEROXIDE 3 %
20 SOLUTION, NON-ORAL MISCELLANEOUS DAILY
Qty: 20 CAPSULE | Refills: 0 | Status: SHIPPED | OUTPATIENT
Start: 2022-04-02 | End: 2022-04-22

## 2022-04-02 NOTE — ED TRIAGE NOTES
Pt presents to department with a CC of intermittent CP that radiates to her back x3 weeks. Pt also reports feeling anxious recently. Pt denies N/V, SOB. PMHx of GERD and reports improvement of pain with antacids.

## 2022-04-02 NOTE — DISCHARGE INSTRUCTIONS
Thank you for allowing us to provide you with medical care today. We realize that you have many choices for your emergency care needs. We thank you for choosing [address] [place] [address]. Please choose us in the future for any continued health care needs. The exam and treatment you received in the emergency department were for an emergent problem and are not intended as complete care. It is important that you follow-up with a doctor. If your symptoms worsen or you do not improve should return to the emergency department. We are available 24 hours a day. Please make an appointment with your health care provider for follow-up of your emergency department visit. Take this sheet with you when you go to your follow-up visit.

## 2022-04-02 NOTE — ED PROVIDER NOTES
Patient is an 50-year-old female with past medical history of arthritis who presents for evaluation of a 2-week history of intermittent chest pain. She reports that the pain at times radiates to her back. She thinks it may be associated with anxiety. She reports occasional radiation of pain into right arm and elbow. She states she has a sense of impending doom. She describes the pain as a pressure and uncomfortable. She reports the pain comes on randomly and is not associated with physical activity. When it comes on, it lasts less than 1 hour. She has no associated shortness of breath, nausea, vomiting. She reports that the pain typically goes away when she takes over-the-counter antacids. She does have a diagnosis of high blood pressure, but has not been taking her prescribed HCTZ for the past 2 years because she does not think she actually needs it.                Past Medical History:   Diagnosis Date    Arthritis     Bronchitis     Difficult intubation     NUMEROUS TMJ SURGERIES CAUSING DIFFICULT INTUBATION SOMETIMES    GERD (gastroesophageal reflux disease)     Psychiatric disorder     DEPRESSION       Past Surgical History:   Procedure Laterality Date    HX HEENT  1983-1988 X4    TMJ SURGERY X4    HX KNEE ARTHROSCOPY Right 2002    HX MOHS PROCEDURES Right 2007    RIGHT TEMPORAL SCALP    HX ORTHOPAEDIC Left 11/19/2014    PARTIAL KNEE REPLACEMENT    HX ROTATOR CUFF REPAIR Bilateral 2006    DR CURTIS    HX TUBAL LIGATION  1974         Family History:   Problem Relation Age of Onset    Stroke Mother     Diabetes Father     Heart Attack Sister     Diabetes Sister     Cancer Sister         URETHRA    Asthma Brother     Other Brother         SUICIDE    Heart Attack Brother     Heart Disease Sister     Heart Surgery Sister     Cancer Brother         PROSTATE    Diabetes Brother     Anesth Problems Neg Hx        Social History     Socioeconomic History    Marital status:  Spouse name: Not on file    Number of children: Not on file    Years of education: Not on file    Highest education level: Not on file   Occupational History    Not on file   Tobacco Use    Smoking status: Never Smoker    Smokeless tobacco: Never Used   Substance and Sexual Activity    Alcohol use: No    Drug use: No    Sexual activity: Not on file   Other Topics Concern    Not on file   Social History Narrative    Not on file     Social Determinants of Health     Financial Resource Strain:     Difficulty of Paying Living Expenses: Not on file   Food Insecurity:     Worried About Running Out of Food in the Last Year: Not on file    Carmita of Food in the Last Year: Not on file   Transportation Needs:     Lack of Transportation (Medical): Not on file    Lack of Transportation (Non-Medical): Not on file   Physical Activity:     Days of Exercise per Week: Not on file    Minutes of Exercise per Session: Not on file   Stress:     Feeling of Stress : Not on file   Social Connections:     Frequency of Communication with Friends and Family: Not on file    Frequency of Social Gatherings with Friends and Family: Not on file    Attends Roman [employer] Services: Not on file    Active Member of [address] [place] [address] or Organizations: Not on file    Attends Club or Organization Meetings: Not on file    Marital Status: Not on file   Intimate Partner Violence:     Fear of Current or Ex-Partner: Not on file    Emotionally Abused: Not on file    Physically Abused: Not on file    Sexually Abused: Not on file   Housing Stability:     Unable to Pay for Housing in the Last Year: Not on file    Number of [place] in the Last Year: Not on file    Unstable Housing in the Last Year: Not on file         ALLERGIES: Augmentin [amoxicillin-pot clavulanate] and Codeine    Review of Systems   Constitutional: Negative for unexpected weight change. HENT: Negative for congestion. Eyes: Negative for visual disturbance. Respiratory: Negative for cough, chest tightness and shortness of breath. Cardiovascular: Positive for chest pain. Negative for palpitations and leg swelling. Gastrointestinal: Negative for abdominal pain, nausea and vomiting. Endocrine: Negative for polyuria. Genitourinary: Negative for dysuria. Musculoskeletal: Negative for arthralgias. Skin: Negative for color change. Allergic/Immunologic: Negative for immunocompromised state. Neurological: Negative for dizziness and headaches. Hematological: Negative for adenopathy. Psychiatric/Behavioral: Negative for agitation. Vitals:    04/02/22 1621   BP: (!) 170/86   Pulse: 77   Resp: 16   Temp: 97.6 °F (36.4 °C)   SpO2: 97%            Physical Exam  Vitals and nursing note reviewed. Constitutional:       Appearance: She is well-developed and normal weight. HENT:      Head: Atraumatic. Cardiovascular:      Rate and Rhythm: Normal rate and regular rhythm. Pulses:           Posterior tibial pulses are 2+ on the right side and 2+ on the left side. Heart sounds: Normal heart sounds. Pulmonary:      Effort: Pulmonary effort is normal.      Breath sounds: Normal breath sounds. Chest:      Chest wall: No tenderness. Abdominal:      Palpations: Abdomen is soft. Tenderness: There is no abdominal tenderness. Musculoskeletal:         General: Normal range of motion. Cervical back: Neck supple. Right lower leg: No tenderness. No edema. Left lower leg: No tenderness. No edema. Skin:     General: Skin is warm and dry. Capillary Refill: Capillary refill takes less than 2 seconds. Neurological:      General: No focal deficit present. Mental Status: She is alert and oriented to person, place, and time.    Psychiatric:         Mood and Affect: Mood normal.         Behavior: Behavior normal.          MDM  Number of Diagnoses or Management Options  Chest pain, unspecified type  Gastroesophageal reflux disease, unspecified whether esophagitis present  Diagnosis management comments: Patient presents with 2-week history of intermittent chest pain. Will obtain cardiac work-up to rule out ACS. Due to patient's age, she will likely need urology follow-up for outpatient stress test if work-up today is without any findings. 1750 -labs unremarkable, troponin VI. Symptoms have been ongoing for 2 weeks. Suspect this is related to GERD as her pain is improving with administration of antiacid. Will start patient on Nexium. Advised her to follow-up with her primary care to be restarted on her hydrochlorothiazide. Will provide referral to cardiology for stress test.    Discussed my clinical impression(s), any labs and/or radiology results with the patient. I answered any questions and addressed any concerns. Discussed the importance of following up with their primary care physician and/or specialist(s). Discussed signs or symptoms that would warrant return back to the ER for further evaluation. The patient is agreeable with discharge. Amount and/or Complexity of Data Reviewed  Clinical lab tests: ordered and reviewed  Tests in the radiology section of CPT®: ordered and reviewed  Discuss the patient with other providers: yes    Patient Progress  Patient progress: stable    ED Course as of 04/02/22 1627   Sat Apr 02, 2022   1620 EKG at 4:13 PM shows normal sinus rhythm, 74 bpm, normal axis, normal intervals, no ST changes, nonspecific T wave flattening, no ectopy.   EKG interpreted by Chaparro Mcclure MD  [IO]      ED Course User Index  [IO] Bushra Gusman MD       Procedures

## 2022-04-04 LAB
ATRIAL RATE: 74 BPM
ATRIAL RATE: 92 BPM
ATRIAL RATE: 93 BPM
CALCULATED P AXIS, ECG09: 25 DEGREES
CALCULATED P AXIS, ECG09: 45 DEGREES
CALCULATED P AXIS, ECG09: 49 DEGREES
CALCULATED R AXIS, ECG10: 24 DEGREES
CALCULATED R AXIS, ECG10: 24 DEGREES
CALCULATED R AXIS, ECG10: 26 DEGREES
CALCULATED T AXIS, ECG11: -30 DEGREES
CALCULATED T AXIS, ECG11: -30 DEGREES
CALCULATED T AXIS, ECG11: 40 DEGREES
DIAGNOSIS, 93000: NORMAL
P-R INTERVAL, ECG05: 150 MS
P-R INTERVAL, ECG05: 154 MS
P-R INTERVAL, ECG05: 178 MS
Q-T INTERVAL, ECG07: 352 MS
Q-T INTERVAL, ECG07: 352 MS
Q-T INTERVAL, ECG07: 400 MS
QRS DURATION, ECG06: 76 MS
QRS DURATION, ECG06: 78 MS
QRS DURATION, ECG06: 78 MS
QTC CALCULATION (BEZET), ECG08: 435 MS
QTC CALCULATION (BEZET), ECG08: 437 MS
QTC CALCULATION (BEZET), ECG08: 444 MS
VENTRICULAR RATE, ECG03: 74 BPM
VENTRICULAR RATE, ECG03: 92 BPM
VENTRICULAR RATE, ECG03: 93 BPM

## 2022-08-16 ENCOUNTER — OFFICE VISIT (OUTPATIENT)
Dept: ORTHOPEDIC SURGERY | Age: 82
End: 2022-08-16
Payer: MEDICARE

## 2022-08-16 VITALS — BODY MASS INDEX: 31.65 KG/M2 | HEIGHT: 65 IN | WEIGHT: 190 LBS

## 2022-08-16 DIAGNOSIS — Z96.652 STATUS POST TOTAL LEFT KNEE REPLACEMENT: Primary | ICD-10-CM

## 2022-08-16 PROCEDURE — 1090F PRES/ABSN URINE INCON ASSESS: CPT | Performed by: ORTHOPAEDIC SURGERY

## 2022-08-16 PROCEDURE — G8427 DOCREV CUR MEDS BY ELIG CLIN: HCPCS | Performed by: ORTHOPAEDIC SURGERY

## 2022-08-16 PROCEDURE — 1123F ACP DISCUSS/DSCN MKR DOCD: CPT | Performed by: ORTHOPAEDIC SURGERY

## 2022-08-16 PROCEDURE — G8400 PT W/DXA NO RESULTS DOC: HCPCS | Performed by: ORTHOPAEDIC SURGERY

## 2022-08-16 PROCEDURE — G8419 CALC BMI OUT NRM PARAM NOF/U: HCPCS | Performed by: ORTHOPAEDIC SURGERY

## 2022-08-16 PROCEDURE — G8536 NO DOC ELDER MAL SCRN: HCPCS | Performed by: ORTHOPAEDIC SURGERY

## 2022-08-16 PROCEDURE — 99213 OFFICE O/P EST LOW 20 MIN: CPT | Performed by: ORTHOPAEDIC SURGERY

## 2022-08-16 PROCEDURE — 1101F PT FALLS ASSESS-DOCD LE1/YR: CPT | Performed by: ORTHOPAEDIC SURGERY

## 2022-08-16 PROCEDURE — G8432 DEP SCR NOT DOC, RNG: HCPCS | Performed by: ORTHOPAEDIC SURGERY

## 2022-08-16 NOTE — PROGRESS NOTES
Bienvenido Ott (: 1940) is a 81 y.o. female, patient, here for evaluation of the following chief complaint(s):  Knee Pain (Left knee pain - LTKA 2014, had a fall 8/10 at [facility] )       HPI:    Evaluation of the left knee after fall. Allergies   Allergen Reactions    Augmentin [Amoxicillin-Pot Clavulanate] Itching    Codeine Palpitations       Current Outpatient Medications   Medication Sig    calcium carbonate (TUMS) 200 mg calcium (500 mg) chew Take 1 Tab by mouth as needed. ascorbic acid, vitamin C, (VITAMIN C) 500 mg tablet Take  by mouth every Monday, Wednesday, Friday. Calcium-Cholecalciferol, D3, 600 mg-10 mcg (400 unit) cap Take  by mouth every Monday, Wednesday, Friday. VIT B COMPLEX 100 CMB #2/HERBS (VITAMIN B COMPLEX 100 #2-HERBS PO) Take  by mouth every Monday, Wednesday, Friday. HYDROcodone-acetaminophen (NORCO) 5-325 mg per tablet Take 0.5-1 Tabs by mouth every four (4) hours as needed. Max Daily Amount: 6 Tabs. Indications: Severe Incisional Knee Pain (Patient not taking: Reported on 2022)    aspirin delayed-release 325 mg tablet Take 1 Tab by mouth every twelve (12) hours. Indications: Prevention of Blood Clots after Partial Knee Replacement (Patient not taking: Reported on 2022)     No current facility-administered medications for this visit.        Past Medical History:   Diagnosis Date    Arthritis     Bronchitis     Difficult intubation     NUMEROUS TMJ SURGERIES CAUSING DIFFICULT INTUBATION SOMETIMES    GERD (gastroesophageal reflux disease)     Psychiatric disorder     DEPRESSION        Past Surgical History:   Procedure Laterality Date    HX HEENT  -1988 X4    TMJ SURGERY X4    HX KNEE ARTHROSCOPY Right 2002    HX MOHS PROCEDURES Right 2007    RIGHT TEMPORAL SCALP    HX ORTHOPAEDIC Left 2014    PARTIAL KNEE REPLACEMENT    HX ROTATOR CUFF REPAIR Bilateral     DR CURTIS    HX TUBAL LIGATION  1974       Family History   Problem Relation Age of Onset    Stroke Mother     Diabetes Father     Heart Attack Sister     Diabetes Sister     Cancer Sister         URETHRA    Asthma Brother     Other Brother         SUICIDE    Heart Attack Brother     Heart Disease Sister     Heart Surgery Sister     Cancer Brother         PROSTATE    Diabetes Brother     Anesth Problems Neg Hx         Social History     Socioeconomic History    Marital status:      Spouse name: Not on file    Number of children: Not on file    Years of education: Not on file    Highest education level: Not on file   Occupational History    Not on file   Tobacco Use    Smoking status: Never    Smokeless tobacco: Never   Substance and Sexual Activity    Alcohol use: No    Drug use: No    Sexual activity: Not on file   Other Topics Concern    Not on file   Social History Narrative    Not on file     Social Determinants of Health     Financial Resource Strain: Not on file   Food Insecurity: Not on file   Transportation Needs: Not on file   Physical Activity: Not on file   Stress: Not on file   Social Connections: Not on file   Intimate Partner Violence: Not on file   Housing Stability: Not on file       ROS    Positive for: Musculoskeletal  Last edited by Hugo Barry on 8/16/2022  2:38 PM.            Vitals:  Ht 5' 5\" (1.651 m)   Wt 190 lb (86.2 kg)   BMI 31.62 kg/m²    Body mass index is 31.62 kg/m². PHYSICAL EXAM:    On exam of her left knee today she has full extension flexion 115 degrees. Some anterior bruising is noted. No effusion. No instability. Patient is able to walk without discomfort. Left hip has painless range of motion  IMAGING:  XR Results (most recent):  Results from Appointment encounter on 08/16/22    XR KNEE LT 3 V    Narrative  3 views left knee. Implant appears well positioned. No evidence of implant loosening. No fractures of her patella. ASSESSMENT/PLAN:  1.  Status post total left knee replacement  -     XR KNEE LT 3 V; Future    Doing well after her fall. Seems to be improving. Reassured her that x-rays are stable. An electronic signature was used to authenticate this note.   --Latrice Menchaca MD

## 2022-08-18 ENCOUNTER — OFFICE VISIT (OUTPATIENT)
Dept: ORTHOPEDIC SURGERY | Age: 82
End: 2022-08-18
Payer: MEDICARE

## 2022-08-18 DIAGNOSIS — M25.512 ACUTE PAIN OF LEFT SHOULDER: Primary | ICD-10-CM

## 2022-08-18 DIAGNOSIS — M54.50 ACUTE LOW BACK PAIN, UNSPECIFIED BACK PAIN LATERALITY, UNSPECIFIED WHETHER SCIATICA PRESENT: ICD-10-CM

## 2022-08-18 DIAGNOSIS — M25.551 RIGHT HIP PAIN: ICD-10-CM

## 2022-08-18 PROCEDURE — 1101F PT FALLS ASSESS-DOCD LE1/YR: CPT | Performed by: ORTHOPAEDIC SURGERY

## 2022-08-18 PROCEDURE — G8536 NO DOC ELDER MAL SCRN: HCPCS | Performed by: ORTHOPAEDIC SURGERY

## 2022-08-18 PROCEDURE — 1090F PRES/ABSN URINE INCON ASSESS: CPT | Performed by: ORTHOPAEDIC SURGERY

## 2022-08-18 PROCEDURE — 1123F ACP DISCUSS/DSCN MKR DOCD: CPT | Performed by: ORTHOPAEDIC SURGERY

## 2022-08-18 PROCEDURE — G8417 CALC BMI ABV UP PARAM F/U: HCPCS | Performed by: ORTHOPAEDIC SURGERY

## 2022-08-18 PROCEDURE — G8400 PT W/DXA NO RESULTS DOC: HCPCS | Performed by: ORTHOPAEDIC SURGERY

## 2022-08-18 PROCEDURE — G8432 DEP SCR NOT DOC, RNG: HCPCS | Performed by: ORTHOPAEDIC SURGERY

## 2022-08-18 PROCEDURE — G8427 DOCREV CUR MEDS BY ELIG CLIN: HCPCS | Performed by: ORTHOPAEDIC SURGERY

## 2022-08-18 PROCEDURE — 99204 OFFICE O/P NEW MOD 45 MIN: CPT | Performed by: ORTHOPAEDIC SURGERY

## 2022-08-19 NOTE — PROGRESS NOTES
Sabrina Mercado (: 1940) is a 81 y.o. female, patient, here for evaluation of the following chief complaint(s):  Shoulder Injury (Left shoulder injury on 8/10/22 when she fell at [facility] on her left side of her body), Hip Pain (right), and Back Pain       HPI:    She has outlined a very detailed description of the fall at [facility]. There is significant redundancy in the description on the new patient intake sheet. She states that her chief complaint is pain numbness weakness in the right foot left knee left shoulder left arm and back. She states that she had a fall August 10, 2022 at 4:15 PM.  Tripped and fell between doors at [facility] at 4:15 PM there was a black rubber mat between doors and my right shoe open Ulice Divers got caught on the crural corner of said my. Causing me to fall on my left side. Hands hurt from trying to catch myself. Entire body traumatized from sudden contact with floor. Curled corner of rubber mat because fall when got caught. August 10, 2022 into homicidal when exiting [employer].  8 days since the injury. He is just starting out first exit door had not gotten but may be foot inside when rubber mat caught my shoe. [employer] in  [address] PM.  EMT name Reginald Lizarraga got me out of the floor. He was trying to get into eat. That her pain is sharp, dull, stabbing, throbbing, aching, burning and intermittent. Does keep her awake especially when trying to turn over or get up to use the bathroom. Her back is unchanged and her pain is worse with standing, walking, exercise, twisting and better with rest and heat and elevation. Allergies   Allergen Reactions    Augmentin [Amoxicillin-Pot Clavulanate] Itching    Codeine Palpitations       Current Outpatient Medications   Medication Sig    HYDROcodone-acetaminophen (NORCO) 5-325 mg per tablet Take 0.5-1 Tabs by mouth every four (4) hours as needed. Max Daily Amount: 6 Tabs.  Indications: Severe Incisional Knee Pain (Patient not taking: Reported on 8/16/2022)    aspirin delayed-release 325 mg tablet Take 1 Tab by mouth every twelve (12) hours. Indications: Prevention of Blood Clots after Partial Knee Replacement (Patient not taking: Reported on 8/16/2022)    calcium carbonate (TUMS) 200 mg calcium (500 mg) chew Take 1 Tab by mouth as needed. ascorbic acid, vitamin C, (VITAMIN C) 500 mg tablet Take  by mouth every Monday, Wednesday, Friday. Calcium-Cholecalciferol, D3, 600 mg-10 mcg (400 unit) cap Take  by mouth every Monday, Wednesday, Friday. VIT B COMPLEX 100 CMB #2/HERBS (VITAMIN B COMPLEX 100 #2-HERBS PO) Take  by mouth every Monday, Wednesday, Friday. No current facility-administered medications for this visit.        Past Medical History:   Diagnosis Date    Arthritis     Bronchitis     Difficult intubation     NUMEROUS TMJ SURGERIES CAUSING DIFFICULT INTUBATION SOMETIMES    GERD (gastroesophageal reflux disease)     Psychiatric disorder     DEPRESSION        Past Surgical History:   Procedure Laterality Date    HX HEENT  1983-1988 X4    TMJ SURGERY X4    HX KNEE ARTHROSCOPY Right 2002    HX MOHS PROCEDURES Right 2007    RIGHT TEMPORAL SCALP    HX ORTHOPAEDIC Left 11/19/2014    PARTIAL KNEE REPLACEMENT    HX ROTATOR CUFF REPAIR Bilateral 2006    DR CURTIS    HX TUBAL LIGATION  1974       Family History   Problem Relation Age of Onset    Stroke Mother     Diabetes Father     Heart Attack Sister     Diabetes Sister     Cancer Sister         URETHRA    Asthma Brother     Other Brother         SUICIDE    Heart Attack Brother     Heart Disease Sister     Heart Surgery Sister     Cancer Brother         PROSTATE    Diabetes Brother     Anesth Problems Neg Hx         Social History     Socioeconomic History    Marital status:      Spouse name: Not on file    Number of children: Not on file    Years of education: Not on file    Highest education level: Not on file   Occupational History    Not on file   Tobacco Use    Smoking status: Never    Smokeless tobacco: Never   Substance and Sexual Activity    Alcohol use: No    Drug use: No    Sexual activity: Not on file   Other Topics Concern    Not on file   Social History Narrative    Not on file     Social Determinants of Health     Financial Resource Strain: Not on file   Food Insecurity: Not on file   Transportation Needs: Not on file   Physical Activity: Not on file   Stress: Not on file   Social Connections: Not on file   Intimate Partner Violence: Not on file   Housing Stability: Not on file       Review of Systems    Vitals: There were no vitals taken for this visit. There is no height or weight on file to calculate BMI. Ortho Exam     The patient is well-developed and well-nourished. The patient presents today in alert and oriented x3 with a normal mood and affect. The patient stands with a normal weightbearing line walks with a slightly antalgic gait because of her left knee pain. Left knee there is minimal point tenderness throughout the entire knee. Range of motion is well-maintained but slightly less than full flexion. Strength is normal.  Sensations intact, pulses are 2+. Skin is normal with well-healed surgical incisions. Left shoulder has no shoulder girdle atrophy. There is no soft tissue swelling, ecchymosis, abrasions, or lacerations. Active range of motion of the shoulder is limited to 140 degrees of forward flexion, 120 degrees of lateral abduction, and 70 degrees of external rotation. Internal rotation is to the SI joint and is painful. Passive range of motion is full with a painful impingement sign and painful Sheets sign. Rotator cuff strength is painful to evaluate and is a 4+/5 with forward flexion and lateral abduction. External rotation strength is maintained. There is no crepitation about the joint. Palpation of the [facility] joint does reproduce discomfort and there is pain elicited with cross body abduction. Strength of the extremity is 5/5 strength at biceps/triceps/wrist extension and is comparable to the contralateral side. DTRs are intact at +2/4 and are symmetrical.  Cervical range of motion is full with no pain to palpation along the paraspinal musculature medial border of the scapula. Spurling's sign is negative. Right hip there is no point tenderness. Range of motion is well-maintained. Strength is normal.  Sensations intact, pulses are 2+. Skin is normal.    ASSESSMENT/PLAN:      1. Acute pain of left shoulder  -     XR SHOULDER LT AP/LAT MIN 2 V; Future  2. Acute low back pain, unspecified back pain laterality, unspecified whether sciatica present  -     XR SPINE LUMB 2 OR 3 V; Future  -     XR HIP RT W OR WO PELV 2-3 VWS; Future  3. Right hip pain  -     XR HIP RT W OR WO PELV 2-3 VWS; Future  XR Results (most recent):  Results from Appointment encounter on 08/16/22    XR KNEE LT 3 V    Narrative  3 views left knee. Implant appears well positioned. No evidence of implant loosening. No fractures of her patella. Results from [place] encounter on 04/02/22    XR CHEST PA LAT    Narrative  EXAM: Chest x-ray, 2 views    INDICATION: Chest pain    COMPARISON: 8/14/2011. FINDINGS: PA and lateral radiographs of the chest demonstrate clear lungs and  pleural margins. Cardiac, mediastinal and hilar contours are within normal  limits. The bones are moderately osteopenic. Diffuse idiopathic skeletal  hyperostosis is shown. Impression  No acute findings. Results from [place] encounter on 08/27/17    XR HIP RT W OR WO PELV 2-3 VWS    Narrative  INDICATION:  eval for hip fx. Right hip pain, status post ground-level fall this  morning. AP view of the pelvis and lateral view of the right hip. No acute fracture or dislocation is visualized. There is moderate narrowing of  the hip joint spaces bilaterally. Bones are well-mineralized.  Soft tissues are  normal. No lytic or sclerotic osseous lesion is visualized. Impression  IMPRESSION: No evidence of acute fracture or dislocation. Below is the assessment and plan developed based on review of pertinent history, physical exam, labs, studies, and medications. We reviewed the previous x-rays and reports as noted above. She clearly has had some trauma and is shaken from her experience. I think the knee and hip will be fine but we did talk about her shoulder pain. She may have injured her shoulder as there is some limitation in her motion and likely acute exacerbation of her pre-existing condition. We talked about overall treatment options and suggested we try a period of physical therapy to see if we can decrease her discomfort and regain her left shoulder range of motion. Will also work on her back and knees. She will ice and elevate and use anti-inflammatory medication as needed. We did also talk about topical diclofenac. She is not typically a fan of medication and would like to avoid oral medications. She does have some past history of esophageal reflux. We are back in 4 weeks for reevaluation and discussion of further treatment options depending on her progress with formal physical therapy. Return in about 4 weeks (around 9/15/2022). An electronic signature was used to authenticate this note.   -- Jennifer Saavedra MD

## 2022-08-30 ENCOUNTER — OFFICE VISIT (OUTPATIENT)
Dept: ORTHOPEDIC SURGERY | Age: 82
End: 2022-08-30
Payer: MEDICARE

## 2022-08-30 VITALS — BODY MASS INDEX: 31.65 KG/M2 | WEIGHT: 190 LBS | HEIGHT: 65 IN

## 2022-08-30 DIAGNOSIS — M25.512 ACUTE PAIN OF LEFT SHOULDER: Primary | ICD-10-CM

## 2022-08-30 PROCEDURE — 1123F ACP DISCUSS/DSCN MKR DOCD: CPT | Performed by: STUDENT IN AN ORGANIZED HEALTH CARE EDUCATION/TRAINING PROGRAM

## 2022-08-30 PROCEDURE — 99214 OFFICE O/P EST MOD 30 MIN: CPT | Performed by: STUDENT IN AN ORGANIZED HEALTH CARE EDUCATION/TRAINING PROGRAM

## 2022-08-30 NOTE — PROGRESS NOTES
Kvng Joseph (: 1940) is a 81 y.o. female here for evaluation of the following chief complaint(s):  Back Pain       ASSESSMENT/PLAN:  Below is the assessment and plan developed based on review of pertinent history, physical exam, labs, studies, and medications. 1. Acute pain of left shoulder    Return if symptoms worsen or fail to improve. I recommended that this patient continue physical therapy and follow-up with Dr. Bardales. She has only done 1 session of PT to date. I do not feel that her arm pain is coming from her neck at this point in time. If she has failed conservative management for her shoulder and Dr. Bardales still feels that her pain is coming from her neck I am happy to see her again. Red flag symptoms discussed with the patient. Patient is to present to the emergency department if any of these symptoms occur. Patient verbalized understanding and agrees to proceed with the aforementioned plan. Thank you for allowing me to participate in the care of this patient. SUBJECTIVE/OBJECTIVE:    HPI    Patient is an 52-year-old female with 3-week history of left shoulder and upper arm pain. She was previously seen by Dr. Bardales and worked up for shoulder pathology. She was prescribed physical therapy for which she has done 1 session to date. She has no paresthesias. No hand weakness. She has difficulty raising the arm overhead. She has difficulty finding a position of comfort. Chief Complaint   Patient presents with    Back Pain     Current Outpatient Medications   Medication Sig    aspirin delayed-release 325 mg tablet Take 1 Tab by mouth every twelve (12) hours. Indications: Prevention of Blood Clots after Partial Knee Replacement    calcium carbonate (TUMS) 200 mg calcium (500 mg) chew Take 1 Tab by mouth as needed. ascorbic acid, vitamin C, (VITAMIN C) 500 mg tablet Take  by mouth every Monday, Wednesday, Friday.     Calcium-Cholecalciferol, D3, 600 mg-10 mcg (400 unit) cap Take  by mouth every Monday, Wednesday, Friday. VIT B COMPLEX 100 CMB #2/HERBS (VITAMIN B COMPLEX 100 #2-HERBS PO) Take  by mouth every Monday, Wednesday, Friday. HYDROcodone-acetaminophen (NORCO) 5-325 mg per tablet Take 0.5-1 Tabs by mouth every four (4) hours as needed. Max Daily Amount: 6 Tabs. Indications: Severe Incisional Knee Pain (Patient not taking: Reported on 8/30/2022)     No current facility-administered medications for this visit.        Past Medical History:   Diagnosis Date    Arthritis     Bronchitis     Difficult intubation     NUMEROUS TMJ SURGERIES CAUSING DIFFICULT INTUBATION SOMETIMES    GERD (gastroesophageal reflux disease)     Psychiatric disorder     DEPRESSION     Past Surgical History:   Procedure Laterality Date    HX HEENT  1983-1988 X4    TMJ SURGERY X4    HX KNEE ARTHROSCOPY Right 2002    HX MOHS PROCEDURES Right 2007    RIGHT TEMPORAL SCALP    HX ORTHOPAEDIC Left 11/19/2014    PARTIAL KNEE REPLACEMENT    HX ROTATOR CUFF REPAIR Bilateral 2006    DR CURTIS    HX TUBAL LIGATION  1974     Family History   Problem Relation Age of Onset    Stroke Mother     Diabetes Father     Heart Attack Sister     Diabetes Sister     Cancer Sister         URETHRA    Asthma Brother     Other Brother         SUICIDE    Heart Attack Brother     Heart Disease Sister     Heart Surgery Sister     Cancer Brother         PROSTATE    Diabetes Brother     Anesth Problems Neg Hx      Social History     Tobacco Use    Smoking status: Never    Smokeless tobacco: Never   Substance Use Topics    Alcohol use: No    Drug use: No      Social History     Tobacco Use   Smoking Status Never   Smokeless Tobacco Never     Social History     Substance and Sexual Activity   Alcohol Use No       Review of Systems  Red flag symptoms: None  Bowel/Bladder/Saddle Anesthesia: Denies  Weakness/Sensory Disturbance: Denies  Ambulation/Falls: Ambulates without assist, no fall history      Ht 5' 5\" (1.651 m)   Wt 190 lb (86.2 kg)   BMI 31.62 kg/m²      Physical Exam    GENERAL:  AAOx3, appears stated age, no distress  Body habitus: Normal    UPPER EXTREMITIES:  Motor: Difficulty raising arm overhead. Barely able to abduct the shoulder 45 degrees. Grossly neurovascularly intact distally. Wrist extensors, wrist flexors, and hand intrinsics 4+/5. Sensory: Intact to light touch in all dermatomes C5-T1 bilaterally  Reflexes: Normal C5-C7 reflexes bilaterally  Pathological reflexes: Negative Jocelyn's bilaterally  Special tests: Negative Spurling's      IMAGING:    None new obtained today. An electronic signature was used to authenticate this note.   -- Sarath Neely, DO

## 2022-08-30 NOTE — PROGRESS NOTES
1. Have you been to the ER, urgent care clinic since your last visit? Hospitalized since your last visit? No    2. Have you seen or consulted any other health care providers outside of the [address] [place] [address] since your last visit? Include any pap smears or colon screening.  No    Chief Complaint   Patient presents with    Back Pain

## 2022-09-19 ENCOUNTER — OFFICE VISIT (OUTPATIENT)
Dept: ORTHOPEDIC SURGERY | Age: 82
End: 2022-09-19
Payer: MEDICARE

## 2022-09-19 VITALS — HEIGHT: 66 IN | WEIGHT: 190 LBS | BODY MASS INDEX: 30.53 KG/M2

## 2022-09-19 DIAGNOSIS — M19.012 ARTHRITIS OF LEFT ACROMIOCLAVICULAR JOINT: ICD-10-CM

## 2022-09-19 DIAGNOSIS — M25.512 CHRONIC LEFT SHOULDER PAIN: ICD-10-CM

## 2022-09-19 DIAGNOSIS — G89.29 CHRONIC LEFT SHOULDER PAIN: ICD-10-CM

## 2022-09-19 DIAGNOSIS — S46.012D TRAUMATIC COMPLETE TEAR OF LEFT ROTATOR CUFF, SUBSEQUENT ENCOUNTER: ICD-10-CM

## 2022-09-19 DIAGNOSIS — M25.512 ACUTE PAIN OF LEFT SHOULDER: Primary | ICD-10-CM

## 2022-09-19 DIAGNOSIS — M75.42 SHOULDER IMPINGEMENT, LEFT: ICD-10-CM

## 2022-09-19 PROCEDURE — G8417 CALC BMI ABV UP PARAM F/U: HCPCS | Performed by: ORTHOPAEDIC SURGERY

## 2022-09-19 PROCEDURE — G8536 NO DOC ELDER MAL SCRN: HCPCS | Performed by: ORTHOPAEDIC SURGERY

## 2022-09-19 PROCEDURE — G8400 PT W/DXA NO RESULTS DOC: HCPCS | Performed by: ORTHOPAEDIC SURGERY

## 2022-09-19 PROCEDURE — 1090F PRES/ABSN URINE INCON ASSESS: CPT | Performed by: ORTHOPAEDIC SURGERY

## 2022-09-19 PROCEDURE — 99213 OFFICE O/P EST LOW 20 MIN: CPT | Performed by: ORTHOPAEDIC SURGERY

## 2022-09-19 PROCEDURE — 1123F ACP DISCUSS/DSCN MKR DOCD: CPT | Performed by: ORTHOPAEDIC SURGERY

## 2022-09-19 PROCEDURE — G8427 DOCREV CUR MEDS BY ELIG CLIN: HCPCS | Performed by: ORTHOPAEDIC SURGERY

## 2022-09-19 PROCEDURE — 1101F PT FALLS ASSESS-DOCD LE1/YR: CPT | Performed by: ORTHOPAEDIC SURGERY

## 2022-09-19 PROCEDURE — G8432 DEP SCR NOT DOC, RNG: HCPCS | Performed by: ORTHOPAEDIC SURGERY

## 2022-09-19 NOTE — PROGRESS NOTES
Clemente Polanco (: 1940) is a 81 y.o. female, patient, here for evaluation of the following chief complaint(s):  Shoulder Pain (left)       HPI:    She has outlined a very detailed description of the fall at [facility] at her last visit. She states that her chief complaint is pain numbness weakness in the right foot left knee left shoulder left arm and back. She states that she had a fall August 10, 2022 at 4:15 PM.  The patient tripped and fell between doors at [facility] at 4:15 PM there was a black rubber mat between doors and my right shoe open saddle got caught on the crural corner of said mat which caused her to fall on my left side. Her hands hurt from trying to catch myself. Entire body traumatized from sudden contact with floor. Curled corner of rubber mat because fall when got caught. The patient states that her pain level is essentially the same as it was at her last visit. She describes her left shoulder pain as sharp, throbbing, and intermittent. She has been experiencing some tingling and weakness in her left shoulder and upper extremity. The patient has been taking Tylenol for her discomfort as needed. She has been attending formal physical therapy since her last visit. Allergies   Allergen Reactions    Augmentin [Amoxicillin-Pot Clavulanate] Itching    Codeine Palpitations       Current Outpatient Medications   Medication Sig    HYDROcodone-acetaminophen (NORCO) 5-325 mg per tablet Take 0.5-1 Tabs by mouth every four (4) hours as needed. Max Daily Amount: 6 Tabs. Indications: Severe Incisional Knee Pain (Patient not taking: Reported on 2022)    aspirin delayed-release 325 mg tablet Take 1 Tab by mouth every twelve (12) hours. Indications: Prevention of Blood Clots after Partial Knee Replacement    calcium carbonate (TUMS) 200 mg calcium (500 mg) chew Take 1 Tab by mouth as needed.     ascorbic acid, vitamin C, (VITAMIN C) 500 mg tablet Take  by mouth every Monday, Wednesday, Friday. Calcium-Cholecalciferol, D3, 600 mg-10 mcg (400 unit) cap Take  by mouth every Monday, Wednesday, Friday. VIT B COMPLEX 100 CMB #2/HERBS (VITAMIN B COMPLEX 100 #2-HERBS PO) Take  by mouth every Monday, Wednesday, Friday. No current facility-administered medications for this visit.        Past Medical History:   Diagnosis Date    Arthritis     Bronchitis     Difficult intubation     NUMEROUS TMJ SURGERIES CAUSING DIFFICULT INTUBATION SOMETIMES    GERD (gastroesophageal reflux disease)     Psychiatric disorder     DEPRESSION        Past Surgical History:   Procedure Laterality Date    HX HEENT  1983-1988 X4    TMJ SURGERY X4    HX KNEE ARTHROSCOPY Right 2002    HX MOHS PROCEDURES Right 2007    RIGHT TEMPORAL SCALP    HX ORTHOPAEDIC Left 11/19/2014    PARTIAL KNEE REPLACEMENT    HX ROTATOR CUFF REPAIR Bilateral 2006    DR CURTIS    HX TUBAL LIGATION  1974       Family History   Problem Relation Age of Onset    Stroke Mother     Diabetes Father     Heart Attack Sister     Diabetes Sister     Cancer Sister         URETHRA    Asthma Brother     Other Brother         SUICIDE    Heart Attack Brother     Heart Disease Sister     Heart Surgery Sister     Cancer Brother         PROSTATE    Diabetes Brother     Anesth Problems Neg Hx         Social History     Socioeconomic History    Marital status:      Spouse name: Not on file    Number of children: Not on file    Years of education: Not on file    Highest education level: Not on file   Occupational History    Not on file   Tobacco Use    Smoking status: Never    Smokeless tobacco: Never   Substance and Sexual Activity    Alcohol use: No    Drug use: No    Sexual activity: Not on file   Other Topics Concern    Not on file   Social History Narrative    Not on file     Social Determinants of Health     Financial Resource Strain: Not on file   Food Insecurity: Not on file   Transportation Needs: Not on file   Physical Activity: Not on file   Stress: Not on file   Social Connections: Not on file   Intimate Partner Violence: Not on file   Housing Stability: Not on file       Review of Systems   All other systems reviewed and are negative. Vitals:  Ht 5' 5.5\" (1.664 m)   Wt 190 lb (86.2 kg)   BMI 31.14 kg/m²    Body mass index is 31.14 kg/m². Ortho Exam     The patient is well-developed and well-nourished. The patient presents today in alert and oriented x3 with a normal mood and affect. The patient stands with a normal weightbearing line and walks with a normal gait. Left shoulder has no shoulder girdle atrophy. There is no soft tissue swelling, ecchymosis, abrasions, or lacerations. Active range of motion of the shoulder is limited to 140 degrees of forward flexion, 120 degrees of lateral abduction, and 70 degrees of external rotation. Internal rotation is to the SI joint and is painful. Passive range of motion is full with a painful impingement sign and painful Sheets sign. Rotator cuff strength is painful to evaluate and is a 4+/5 with forward flexion and lateral abduction. External rotation strength is maintained. There is no crepitation about the joint. Palpation of the [facility] joint does reproduce discomfort and there is pain elicited with cross body abduction. Strength of the extremity is 5/5 strength at biceps/triceps/wrist extension and is comparable to the contralateral side. DTRs are intact at +2/4 and are symmetrical.  Cervical range of motion is full with no pain to palpation along the paraspinal musculature medial border of the scapula. Spurling's sign is negative. ASSESSMENT/PLAN:      1. Acute pain of left shoulder  2. Chronic left shoulder pain  3. Traumatic complete tear of left rotator cuff, subsequent encounter  -     MRI SHOULDER LT WO CONT; Future  4. Shoulder impingement, left  -     MRI SHOULDER LT WO CONT; Future  5.  Arthritis of left acromioclavicular joint  -     MRI SHOULDER LT WO CONT; Future Below is the assessment and plan developed based on review of pertinent history, physical exam, labs, studies, and medications. We discussed the patient's ongoing left shoulder pain and her signs, symptoms, physical exam, description of her pain, description of her injury, and previous x-rays are consistent with a traumatic rotator cuff tear, impingement, and acromioclavicular arthritis. The possible treatment options were discussed with the patient and because of the duration of her increased pain, no improvement with multiple modalities of conservative management including formal physical therapy and an at-home exercise program, her physical exam, description of her pain, description of her injury, previous x-rays, and her inability to complete daily living activities without significant discomfort we elected to obtain an MRI of her left shoulder to further evaluate the severity of her rotator cuff tear, impingement, and acromioclavicular joint arthritis. The MRI images and results will be used in preoperative planning if and almost certainly when surgical intervention is necessary. The risks and benefits of the MRI were discussed in detail with the patient and she would like to proceed. We will schedule this at her convenience. I will see her back after MRI is complete to discuss the images, results, treatment options. In the interim, I did encourage her to ice when possible, modify her activity level based on her left shoulder pain, and use anti-inflammatory medication when necessary. She will also work on range of motion, strengthening, and stretching exercises with an at-home exercise program as pain tolerates. I will see her back as noted above after left shoulder MRI is complete. **We will obtain an MRI for more information to determine the best treatment plan moving forward and help us prepare for surgical intervention if necessary. **    Return for After her left shoulder MRI is complete. An electronic signature was used to authenticate this note.   -- Riccardo Castle MD

## 2023-02-28 ENCOUNTER — OFFICE VISIT (OUTPATIENT)
Dept: ORTHOPEDIC SURGERY | Age: 83
End: 2023-02-28

## 2023-02-28 DIAGNOSIS — M75.42 SHOULDER IMPINGEMENT, LEFT: ICD-10-CM

## 2023-02-28 DIAGNOSIS — G89.29 CHRONIC LEFT SHOULDER PAIN: ICD-10-CM

## 2023-02-28 DIAGNOSIS — M75.02 ADHESIVE CAPSULITIS OF LEFT SHOULDER: ICD-10-CM

## 2023-02-28 DIAGNOSIS — M25.512 ACUTE PAIN OF LEFT SHOULDER: Primary | ICD-10-CM

## 2023-02-28 DIAGNOSIS — M19.012 ARTHRITIS OF LEFT ACROMIOCLAVICULAR JOINT: ICD-10-CM

## 2023-02-28 DIAGNOSIS — M25.512 CHRONIC LEFT SHOULDER PAIN: ICD-10-CM

## 2023-02-28 NOTE — PROGRESS NOTES
Vimal Bernal (: 1940) is a 81 y.o. female, patient, here for evaluation of the following chief complaint(s):  Shoulder Pain (left)       HPI:    She was last seen for left shoulder pain on 2022. Since then, the patient did have an MRI performed on her left shoulder on 2022. The patient states that her pain level is a 10, sharp, dull, achy and intermittent but does keep her awake. She does note some numbness and tingling. Left shoulder MRI images and results were independently reviewed from an outside facility and they were consistent with mild thinning and irregularity of the distal supraspinatus and infraspinatus tendons likely related to prior surgery/tendinopathy. No evidence of a full-thickness tear is demonstrated. Mild amount of fluid in the subacromial and subdeltoid bursa. Mild acromioclavicular osteoarthritis. Status post distal clavicle resection and acromioplasty. Postsurgical/degenerative changes in the superior to superior posterior labrum. Probable biceps tenodesis. Allergies   Allergen Reactions    Augmentin [Amoxicillin-Pot Clavulanate] Itching    Codeine Palpitations       Current Outpatient Medications   Medication Sig    HYDROcodone-acetaminophen (NORCO) 5-325 mg per tablet Take 0.5-1 Tabs by mouth every four (4) hours as needed. Max Daily Amount: 6 Tabs. Indications: Severe Incisional Knee Pain (Patient not taking: Reported on 2022)    aspirin delayed-release 325 mg tablet Take 1 Tab by mouth every twelve (12) hours. Indications: Prevention of Blood Clots after Partial Knee Replacement    calcium carbonate (TUMS) 200 mg calcium (500 mg) chew Take 1 Tab by mouth as needed. ascorbic acid, vitamin C, (VITAMIN C) 500 mg tablet Take  by mouth every Monday, Wednesday, Friday. Calcium-Cholecalciferol, D3, 600 mg-10 mcg (400 unit) cap Take  by mouth every Monday, Wednesday, Friday.     VIT B COMPLEX 100 CMB #2/HERBS (VITAMIN B COMPLEX 100 #2-HERBS PO) Take by mouth every Monday, Wednesday, Friday. No current facility-administered medications for this visit.        Past Medical History:   Diagnosis Date    Arthritis     Bronchitis     Difficult intubation     NUMEROUS TMJ SURGERIES CAUSING DIFFICULT INTUBATION SOMETIMES    GERD (gastroesophageal reflux disease)     Psychiatric disorder     DEPRESSION        Past Surgical History:   Procedure Laterality Date    HX HEENT  1983-1988 X4    TMJ SURGERY X4    HX KNEE ARTHROSCOPY Right 2002    HX MOHS PROCEDURES Right 2007    RIGHT TEMPORAL SCALP    HX ORTHOPAEDIC Left 11/19/2014    PARTIAL KNEE REPLACEMENT    HX ROTATOR CUFF REPAIR Bilateral 2006    DR CURTIS    HX TUBAL LIGATION  1974       Family History   Problem Relation Age of Onset    Stroke Mother     Diabetes Father     Heart Attack Sister     Diabetes Sister     Cancer Sister         URETHRA    Asthma Brother     Other Brother         SUICIDE    Heart Attack Brother     Heart Disease Sister     Heart Surgery Sister     Cancer Brother         PROSTATE    Diabetes Brother     Anesth Problems Neg Hx         Social History     Socioeconomic History    Marital status:      Spouse name: Not on file    Number of children: Not on file    Years of education: Not on file    Highest education level: Not on file   Occupational History    Not on file   Tobacco Use    Smoking status: Never    Smokeless tobacco: Never   Substance and Sexual Activity    Alcohol use: No    Drug use: No    Sexual activity: Not on file   Other Topics Concern    Not on file   Social History Narrative    Not on file     Social Determinants of Health     Financial Resource Strain: Not on file   Food Insecurity: Not on file   Transportation Needs: Not on file   Physical Activity: Not on file   Stress: Not on file   Social Connections: Not on file   Intimate Partner Violence: Not on file   Housing Stability: Not on file       Review of Systems   All other systems reviewed and are negative. Vitals:  Ht 5' 5.5\" (1.664 m)   Wt 198 lb (89.8 kg)   BMI 32.45 kg/m²    Body mass index is 32.45 kg/m². Ortho Exam     The patient is well-developed and well-nourished. The patient presents today in alert and oriented x3 with a normal mood and affect. The patient stands with a normal weightbearing line and walks with a normal gait. Left shoulder has no shoulder girdle atrophy. There is no soft tissue swelling, ecchymosis, abrasions, or lacerations. Active range of motion of the shoulder is limited to 40 degrees of forward flexion, 20 degrees of lateral abduction, and 10 degrees of external rotation. Internal rotation is to the based and is painful. Passive range of motion is up to approximately 90 degrees of forward flexion and 60 degrees of abduction. Rotator cuff strength is painful to evaluate and is a 4+/5 with internal and external rotation. There is no crepitation about the joint. Palpation of the [facility] joint does reproduce discomfort and there is pain elicited with cross body abduction. Strength of the extremity is 4+/5 strength at biceps/triceps/wrist extension and is comparable to the contralateral side. DTRs are intact at +2/4 and are symmetrical.  Cervical range of motion is full with no pain to palpation along the paraspinal musculature medial border of the scapula. Spurling's sign is negative. ASSESSMENT/PLAN:      1. Acute pain of left shoulder  -     XR SHOULDER LT AP/LAT MIN 2 V; Future  2. Chronic left shoulder pain  3. Adhesive capsulitis of left shoulder  4. Shoulder impingement, left  5. Arthritis of left acromioclavicular joint     XR Results (most recent):  Results from Appointment encounter on 02/28/23    XR SHOULDER LT AP/LAT MIN 2 V    Narrative  Three-view x-rays of the left shoulder shows no evidence of a fracture or dislocation. There is no evidence of degenerative disease. There is some evidence of impingement and AC joint arthrosis.        Below is the assessment and plan developed based on review of pertinent history, physical exam, labs, studies, and medications. We discussed the patient's ongoing left shoulder pain and we independently reviewed her MRI images and results and they were consistent with mild thinning and irregularity of the distal supraspinatus and infraspinatus tendons likely related to prior surgery/tendinopathy. No evidence of a full-thickness tear is demonstrated. Mild amount of fluid in the subacromial and subdeltoid bursa. Mild acromioclavicular osteoarthritis. Status post distal clavicle resection and acromioplasty. Postsurgical/degenerative changes in the superior to superior posterior labrum. Probable biceps tenodesis. The possible treatment options were discussed with the patient and daughter and despite the very unusual nature of the presentation I think she has frozen shoulder/adhesive capsulitis. We talked about her inability to do physical therapy because of her discomfort, we talked about the possibility of a Medrol Dosepak which she would like to avoid and after very long discussion I showed her passive only range of motion exercises to do at home 2-3 times a day for 2 to 3 minutes and then ice and anti-inflammatory medication. I will see her back now in 2 weeks to see if she has made any progress and if not we would consider a Medrol Dosepak. In the meantime, she is to contact her family practice physician/internist to discuss the possibility of Medrol dose pack for the future. I will see her back in 2 weeks. Return in about 2 weeks (around 3/14/2023) for Reevaluation and further discussion. An electronic signature was used to authenticate this note.   -- Trever Browning MD

## 2023-03-01 VITALS — BODY MASS INDEX: 31.82 KG/M2 | HEIGHT: 66 IN | WEIGHT: 198 LBS

## (undated) DEVICE — HOOK LOCK LATEX FREE ELASTIC BANDAGE D/L 6INX10YD

## (undated) DEVICE — SOLUTION IRRIG 1000ML H2O STRL BLT

## (undated) DEVICE — BLADE ELECTRODE: Brand: EDGE

## (undated) DEVICE — INFECTION CONTROL KIT SYS

## (undated) DEVICE — SUTURE VCRL SZ 2-0 L36IN ABSRB UD L40MM CT 1/2 CIR J957H

## (undated) DEVICE — SLIM BODY SKIN STAPLER: Brand: APPOSE ULC

## (undated) DEVICE — REM POLYHESIVE ADULT PATIENT RETURN ELECTRODE: Brand: VALLEYLAB

## (undated) DEVICE — SOLUTION IRRIG 3000ML 0.9% SOD CHL FLX CONT 0797208] ICU MEDICAL INC]

## (undated) DEVICE — BLADE SAW W0.49XL3.15IN THK0.047IN CUT THK0.047IN REPL SAG

## (undated) DEVICE — SYRINGE MED 20ML STD CLR PLAS LUERLOCK TIP N CTRL DISP

## (undated) DEVICE — Z DISCONTINUED USE 2744636  DRESSING AQUACEL 14 IN ALG W3.5XL14IN POLYUR FLM CVR W/ HYDRCOLL

## (undated) DEVICE — BANDAGE COMPR ELASTIC 5 YDX6 IN

## (undated) DEVICE — Device

## (undated) DEVICE — PADDING CST 6IN STERILE --

## (undated) DEVICE — BOWL BNE CEM MIX SPAT CURET SMARTMIX CTS

## (undated) DEVICE — PADDING CAST SPEC 6INX4YD COT --

## (undated) DEVICE — SUTURE VCRL SZ 0 L36IN ABSRB VLT L40MM CT 1/2 CIR J358H

## (undated) DEVICE — 3M™ IOBAN™ 2 ANTIMICROBIAL INCISE DRAPE 6651EZ: Brand: IOBAN™ 2

## (undated) DEVICE — STERILE POLYISOPRENE POWDER-FREE SURGICAL GLOVES WITH EMOLLIENT COATING: Brand: PROTEXIS

## (undated) DEVICE — NEEDLE HYPO 18GA L1.5IN PNK S STL HUB POLYPR SHLD REG BVL

## (undated) DEVICE — SCRUB DRY SURG EZ SCRUB BRUSH PREOPERATIVE GRN

## (undated) DEVICE — PREP SKN PREVAIL 40ML APPL --

## (undated) DEVICE — TRAY CATH 16F URIN MTR LTX -- CONVERT TO ITEM 363111

## (undated) DEVICE — DRAPE,EXTREMITY,89X128,STERILE: Brand: MEDLINE

## (undated) DEVICE — SUTURE STRATAFIX SYMMETRIC PDS + SZ 1 L18IN ABSRB VLT L48MM SXPP1A400

## (undated) DEVICE — HANDPIECE SET WITH BONE CLEANING TIP AND SUCTION TUBE: Brand: INTERPULSE

## (undated) DEVICE — NEEDLE HYPO 22GA L1.5IN BLK S STL HUB POLYPR SHLD REG BVL

## (undated) DEVICE — ZIMMER® STERILE DISPOSABLE TOURNIQUET CUFF WITH PLC, DUAL PORT, SINGLE BLADDER, 34 IN. (86 CM)

## (undated) DEVICE — SUTURE VCRL SZ 2 L54IN ABSRB UD L65MM TP-1 1/2 CIR J880T

## (undated) DEVICE — 4-PORT MANIFOLD: Brand: NEPTUNE 2

## (undated) DEVICE — HANDLE LT SNAP ON ULT DURABLE LENS FOR TRUMPF ALC DISPOSABLE

## (undated) DEVICE — DEVON™ KNEE AND BODY STRAP 60" X 3" (1.5 M X 7.6 CM): Brand: DEVON

## (undated) DEVICE — STERILE POLYISOPRENE POWDER-FREE SURGICAL GLOVES: Brand: PROTEXIS

## (undated) DEVICE — T4 HOOD

## (undated) DEVICE — BLADE SAW W073XL276IN THK0031IN CUT THK0036IN REPL SAG

## (undated) DEVICE — SUTURE MCRYL SZ 4-0 L27IN ABSRB UD L24MM PS-1 3/8 CIR PRIM Y935H

## (undated) DEVICE — CONTAINER,SPECIMEN,STRL PATH,4OZ: Brand: MEDLINE

## (undated) DEVICE — DEVICE TRNSF SPIK STL 2008S] MICROTEK MEDICAL INC]